# Patient Record
Sex: MALE | Race: BLACK OR AFRICAN AMERICAN | NOT HISPANIC OR LATINO
[De-identification: names, ages, dates, MRNs, and addresses within clinical notes are randomized per-mention and may not be internally consistent; named-entity substitution may affect disease eponyms.]

---

## 2017-10-10 PROBLEM — Z00.00 ENCOUNTER FOR PREVENTIVE HEALTH EXAMINATION: Status: ACTIVE | Noted: 2017-10-10

## 2017-10-17 ENCOUNTER — APPOINTMENT (OUTPATIENT)
Dept: UROLOGY | Facility: CLINIC | Age: 67
End: 2017-10-17
Payer: MEDICARE

## 2017-10-17 VITALS — DIASTOLIC BLOOD PRESSURE: 89 MMHG | SYSTOLIC BLOOD PRESSURE: 153 MMHG | HEART RATE: 77 BPM | TEMPERATURE: 99.4 F

## 2017-10-17 DIAGNOSIS — Z86.79 PERSONAL HISTORY OF OTHER DISEASES OF THE CIRCULATORY SYSTEM: ICD-10-CM

## 2017-10-17 DIAGNOSIS — F17.200 NICOTINE DEPENDENCE, UNSPECIFIED, UNCOMPLICATED: ICD-10-CM

## 2017-10-17 PROCEDURE — 99204 OFFICE O/P NEW MOD 45 MIN: CPT

## 2017-10-23 PROBLEM — F17.200 HEAVY TOBACCO SMOKER: Status: ACTIVE | Noted: 2017-10-23

## 2017-10-23 PROBLEM — Z86.79 HISTORY OF HYPERTENSION: Status: RESOLVED | Noted: 2017-10-23 | Resolved: 2017-10-23

## 2017-10-23 LAB
PSA FREE FLD-MCNC: 19.6
PSA FREE SERPL-MCNC: 0.97 NG/ML
PSA SERPL-MCNC: 4.96 NG/ML

## 2017-10-29 ENCOUNTER — FORM ENCOUNTER (OUTPATIENT)
Age: 67
End: 2017-10-29

## 2017-10-30 ENCOUNTER — OUTPATIENT (OUTPATIENT)
Dept: OUTPATIENT SERVICES | Facility: HOSPITAL | Age: 67
LOS: 1 days | End: 2017-10-30
Payer: MEDICARE

## 2017-10-30 PROCEDURE — A9585: CPT

## 2017-10-30 PROCEDURE — 72196 MRI PELVIS W/DYE: CPT

## 2017-10-30 PROCEDURE — 72196 MRI PELVIS W/DYE: CPT | Mod: 26

## 2018-01-16 ENCOUNTER — APPOINTMENT (OUTPATIENT)
Dept: UROLOGY | Facility: CLINIC | Age: 68
End: 2018-01-16
Payer: COMMERCIAL

## 2018-01-16 VITALS
HEART RATE: 86 BPM | SYSTOLIC BLOOD PRESSURE: 133 MMHG | DIASTOLIC BLOOD PRESSURE: 84 MMHG | HEIGHT: 70 IN | OXYGEN SATURATION: 99 % | TEMPERATURE: 98.2 F

## 2018-01-16 VITALS
TEMPERATURE: 98.7 F | HEART RATE: 70 BPM | WEIGHT: 175 LBS | DIASTOLIC BLOOD PRESSURE: 84 MMHG | OXYGEN SATURATION: 99 % | SYSTOLIC BLOOD PRESSURE: 133 MMHG | BODY MASS INDEX: 25.05 KG/M2 | HEIGHT: 70 IN

## 2018-01-16 PROCEDURE — 99213 OFFICE O/P EST LOW 20 MIN: CPT

## 2018-03-13 ENCOUNTER — APPOINTMENT (OUTPATIENT)
Dept: UROLOGY | Facility: CLINIC | Age: 68
End: 2018-03-13
Payer: MEDICARE

## 2018-03-13 ENCOUNTER — LABORATORY RESULT (OUTPATIENT)
Age: 68
End: 2018-03-13

## 2018-03-13 VITALS — TEMPERATURE: 97.4 F | DIASTOLIC BLOOD PRESSURE: 90 MMHG | SYSTOLIC BLOOD PRESSURE: 155 MMHG | HEART RATE: 79 BPM

## 2018-03-13 PROCEDURE — 76942 ECHO GUIDE FOR BIOPSY: CPT | Mod: 59

## 2018-03-13 PROCEDURE — 55700: CPT

## 2018-03-13 PROCEDURE — 76872 US TRANSRECTAL: CPT

## 2018-03-20 ENCOUNTER — APPOINTMENT (OUTPATIENT)
Dept: UROLOGY | Facility: CLINIC | Age: 68
End: 2018-03-20
Payer: MEDICARE

## 2018-03-20 VITALS — SYSTOLIC BLOOD PRESSURE: 146 MMHG | TEMPERATURE: 98.7 F | HEART RATE: 69 BPM | DIASTOLIC BLOOD PRESSURE: 80 MMHG

## 2018-03-20 PROCEDURE — 99214 OFFICE O/P EST MOD 30 MIN: CPT

## 2018-09-13 ENCOUNTER — APPOINTMENT (OUTPATIENT)
Dept: UROLOGY | Facility: CLINIC | Age: 68
End: 2018-09-13
Payer: MEDICARE

## 2018-09-13 VITALS — SYSTOLIC BLOOD PRESSURE: 144 MMHG | HEART RATE: 80 BPM | TEMPERATURE: 98.5 F | DIASTOLIC BLOOD PRESSURE: 81 MMHG

## 2018-09-13 PROCEDURE — 99213 OFFICE O/P EST LOW 20 MIN: CPT

## 2018-09-24 ENCOUNTER — RESULT REVIEW (OUTPATIENT)
Age: 68
End: 2018-09-24

## 2018-09-24 LAB — PSA SERPL-MCNC: 6.95 NG/ML

## 2019-03-12 ENCOUNTER — APPOINTMENT (OUTPATIENT)
Dept: UROLOGY | Facility: CLINIC | Age: 69
End: 2019-03-12
Payer: MEDICARE

## 2019-03-12 VITALS — HEART RATE: 79 BPM | SYSTOLIC BLOOD PRESSURE: 150 MMHG | TEMPERATURE: 98.1 F | DIASTOLIC BLOOD PRESSURE: 83 MMHG

## 2019-03-12 PROCEDURE — 99213 OFFICE O/P EST LOW 20 MIN: CPT

## 2019-03-12 NOTE — HISTORY OF PRESENT ILLNESS
[FreeTextEntry1] : kandis 6 on active surveillance\par PSA from 5-7\par last PNBX 3/2018 small volume akndis 6\par MRI 10/2017 PRIADS 2\par

## 2019-03-13 LAB
APPEARANCE: CLEAR
BACTERIA: NEGATIVE
BILIRUBIN URINE: NEGATIVE
BLOOD URINE: NEGATIVE
COLOR: NORMAL
GLUCOSE QUALITATIVE U: NEGATIVE
HYALINE CASTS: 0 /LPF
KETONES URINE: NEGATIVE
LEUKOCYTE ESTERASE URINE: NEGATIVE
MICROSCOPIC-UA: NORMAL
NITRITE URINE: NEGATIVE
PH URINE: 6.5
PROTEIN URINE: NORMAL
PSA SERPL-MCNC: 6.75 NG/ML
RED BLOOD CELLS URINE: 3 /HPF
SPECIFIC GRAVITY URINE: 1.02
SQUAMOUS EPITHELIAL CELLS: 0 /HPF
UROBILINOGEN URINE: NORMAL
WHITE BLOOD CELLS URINE: 1 /HPF

## 2019-03-18 LAB — BACTERIA UR CULT: NORMAL

## 2019-07-11 ENCOUNTER — APPOINTMENT (OUTPATIENT)
Dept: UROLOGY | Facility: CLINIC | Age: 69
End: 2019-07-11
Payer: MEDICARE

## 2019-07-11 PROCEDURE — 99213 OFFICE O/P EST LOW 20 MIN: CPT

## 2019-07-11 NOTE — ASSESSMENT
[FreeTextEntry1] : prostate cancer\par await MRI results\par will repeat PSA 6 months\par if abnromal MRI for PNBX\par otherwise continue with surveillance\par f/u 6 months

## 2019-07-29 ENCOUNTER — APPOINTMENT (OUTPATIENT)
Dept: SURGERY | Facility: CLINIC | Age: 69
End: 2019-07-29
Payer: MEDICARE

## 2019-07-29 VITALS
TEMPERATURE: 97.9 F | DIASTOLIC BLOOD PRESSURE: 93 MMHG | WEIGHT: 195.38 LBS | BODY MASS INDEX: 28.61 KG/M2 | SYSTOLIC BLOOD PRESSURE: 148 MMHG | HEART RATE: 69 BPM | OXYGEN SATURATION: 99 % | HEIGHT: 69.25 IN

## 2019-07-29 DIAGNOSIS — K43.9 VENTRAL HERNIA W/OUT OBSTRUCTION OR GANGRENE: ICD-10-CM

## 2019-07-29 DIAGNOSIS — K42.9 UMBILICAL HERNIA W/OUT OBSTRUCTION OR GANGRENE: ICD-10-CM

## 2019-07-29 PROCEDURE — 99203 OFFICE O/P NEW LOW 30 MIN: CPT

## 2019-08-07 PROBLEM — K42.9 UMBILICAL HERNIA WITHOUT OBSTRUCTION AND WITHOUT GANGRENE: Status: ACTIVE | Noted: 2019-08-07

## 2019-08-07 PROBLEM — K43.9 VENTRAL HERNIA WITHOUT OBSTRUCTION OR GANGRENE: Status: ACTIVE | Noted: 2019-08-07

## 2019-09-19 ENCOUNTER — RESULT REVIEW (OUTPATIENT)
Age: 69
End: 2019-09-19

## 2019-09-19 ENCOUNTER — OUTPATIENT (OUTPATIENT)
Dept: OUTPATIENT SERVICES | Facility: HOSPITAL | Age: 69
LOS: 1 days | Discharge: HOME | End: 2019-09-19
Payer: MEDICARE

## 2019-09-19 PROCEDURE — S2900 ROBOTIC SURGICAL SYSTEM: CPT | Mod: NC

## 2019-09-19 PROCEDURE — 15734 MUSCLE-SKIN GRAFT TRUNK: CPT

## 2019-09-19 PROCEDURE — 49653: CPT

## 2019-09-19 RX ORDER — IBUPROFEN 200 MG
1 TABLET ORAL
Qty: 42 | Refills: 0
Start: 2019-09-19 | End: 2019-10-02

## 2019-09-19 RX ORDER — DOCUSATE SODIUM 100 MG
1 CAPSULE ORAL
Qty: 28 | Refills: 0
Start: 2019-09-19 | End: 2019-10-02

## 2019-09-19 RX ORDER — OXYCODONE HYDROCHLORIDE 5 MG/1
1 TABLET ORAL
Qty: 10 | Refills: 0
Start: 2019-09-19

## 2019-09-30 ENCOUNTER — APPOINTMENT (OUTPATIENT)
Dept: SURGERY | Facility: CLINIC | Age: 69
End: 2019-09-30
Payer: MEDICARE

## 2019-09-30 VITALS
SYSTOLIC BLOOD PRESSURE: 159 MMHG | OXYGEN SATURATION: 98 % | TEMPERATURE: 97.4 F | BODY MASS INDEX: 29.18 KG/M2 | DIASTOLIC BLOOD PRESSURE: 84 MMHG | HEART RATE: 87 BPM | HEIGHT: 69.25 IN | WEIGHT: 199.25 LBS

## 2019-09-30 PROCEDURE — 99024 POSTOP FOLLOW-UP VISIT: CPT

## 2019-10-09 NOTE — ASSESSMENT
[FreeTextEntry1] : .68 year old male with symptomatic hernia. Now doing well after surgery. We discussed natural scar maturation and gradual return to normal activity.

## 2019-10-09 NOTE — PHYSICAL EXAM
[Normal Breath Sounds] : Normal breath sounds [Normal Heart Sounds] : normal heart sounds [Alert] : alert [Oriented to Person] : oriented to person [Oriented to Place] : oriented to place [Oriented to Time] : oriented to time [Calm] : calm [JVD] : no jugular venous distention  [Abdominal Masses] : No abdominal masses [Abdomen Tenderness] : ~T ~M No abdominal tenderness [Tender] : was nontender [Enlarged] : not enlarged [de-identified] : KRISTI. Sherri. Appropriate. Comfortable. [de-identified] : Pupils equal. No Scleral Icterus. NCAT. [de-identified] : Supple. Trachea midline. No overt lymphadenopathy. No JVD [de-identified] : Abdomen is soft, non tender and non distended. Do not appreciate any overt mass. Incisions are healing well. No evidence of hernia recurrence.

## 2019-10-09 NOTE — HISTORY OF PRESENT ILLNESS
[de-identified] : 68 year old male. Known to me. Now s/p robotic assisted ventral hernia repair with mesh. Doing well. Denies any fevers, chills or shortness of breath. Eating and drinking. Some discomfort but improving.

## 2019-10-21 ENCOUNTER — APPOINTMENT (OUTPATIENT)
Dept: SURGERY | Facility: CLINIC | Age: 69
End: 2019-10-21
Payer: MEDICARE

## 2019-10-21 VITALS
SYSTOLIC BLOOD PRESSURE: 134 MMHG | HEIGHT: 69.25 IN | BODY MASS INDEX: 28.99 KG/M2 | TEMPERATURE: 97.3 F | WEIGHT: 198 LBS | HEART RATE: 109 BPM | OXYGEN SATURATION: 98 % | DIASTOLIC BLOOD PRESSURE: 79 MMHG

## 2019-10-21 DIAGNOSIS — K92.1 MELENA: ICD-10-CM

## 2019-10-21 DIAGNOSIS — R06.02 SHORTNESS OF BREATH: ICD-10-CM

## 2019-10-21 PROCEDURE — 99213 OFFICE O/P EST LOW 20 MIN: CPT | Mod: 24

## 2019-10-22 ENCOUNTER — OTHER (OUTPATIENT)
Age: 69
End: 2019-10-22

## 2019-10-22 LAB
ALBUMIN SERPL ELPH-MCNC: 4.7 G/DL
ALP BLD-CCNC: 53 U/L
ALT SERPL-CCNC: 9 U/L
ANION GAP SERPL CALC-SCNC: 15 MMOL/L
AST SERPL-CCNC: 12 U/L
BASOPHILS # BLD AUTO: 0.14 K/UL
BASOPHILS NFR BLD AUTO: 1.2 %
BILIRUB SERPL-MCNC: 0.2 MG/DL
BUN SERPL-MCNC: 15 MG/DL
CALCIUM SERPL-MCNC: 9.8 MG/DL
CHLORIDE SERPL-SCNC: 103 MMOL/L
CO2 SERPL-SCNC: 24 MMOL/L
CREAT SERPL-MCNC: 1.15 MG/DL
EOSINOPHIL # BLD AUTO: 0.17 K/UL
EOSINOPHIL NFR BLD AUTO: 1.4 %
GLUCOSE SERPL-MCNC: 107 MG/DL
HCT VFR BLD CALC: 33.9 %
HGB BLD-MCNC: 10.7 G/DL
IMM GRANULOCYTES NFR BLD AUTO: 0.3 %
LYMPHOCYTES # BLD AUTO: 2.59 K/UL
LYMPHOCYTES NFR BLD AUTO: 21.8 %
MAN DIFF?: NORMAL
MCHC RBC-ENTMCNC: 29.1 PG
MCHC RBC-ENTMCNC: 31.6 GM/DL
MCV RBC AUTO: 92.1 FL
MONOCYTES # BLD AUTO: 0.95 K/UL
MONOCYTES NFR BLD AUTO: 8 %
NEUTROPHILS # BLD AUTO: 8 K/UL
NEUTROPHILS NFR BLD AUTO: 67.3 %
PLATELET # BLD AUTO: 404 K/UL
POTASSIUM SERPL-SCNC: 3.7 MMOL/L
PROT SERPL-MCNC: 6.8 G/DL
RBC # BLD: 3.68 M/UL
RBC # FLD: 13.9 %
SODIUM SERPL-SCNC: 142 MMOL/L
WBC # FLD AUTO: 11.89 K/UL

## 2019-10-22 NOTE — HISTORY OF PRESENT ILLNESS
[de-identified] : 68 year old male. Known to me. Now s/p robotic assisted ventral hernia repair with mesh. Doing well. Denies any fevers, chills or shortness of breath. Eating and drinking. Some discomfort but improving.  [de-identified] : 10-\par Patient comes to the office today. Overall feels well. No pain at incisions. He is eating and drinking without issue. DOes report to me that he had some shortness of breath while running to catch a bus and also reports he had some blood in recent bowel movement. Happened one time. Hasn't noticed it since. Reports he may be due for an EGD and colonsoscopy. Otherwise well from hernia stanpoibnt.

## 2019-10-22 NOTE — PHYSICAL EXAM
[JVD] : no jugular venous distention  [Normal Breath Sounds] : Normal breath sounds [Normal Heart Sounds] : normal heart sounds [Abdominal Masses] : No abdominal masses [Abdomen Tenderness] : ~T ~M No abdominal tenderness [Enlarged] : not enlarged [Tender] : was nontender [Oriented to Person] : oriented to person [Alert] : alert [Oriented to Place] : oriented to place [Oriented to Time] : oriented to time [Calm] : calm [de-identified] : KRISTI. Sherri. Appropriate. Comfortable. [de-identified] : Pupils equal. No Scleral Icterus. NCAT. [de-identified] : Abdomen is soft, non tender and non distended. Do not appreciate any overt mass. Incisions are healing well. No evidence of hernia recurrence.  [de-identified] : Supple. Trachea midline. No overt lymphadenopathy. No JVD

## 2019-10-22 NOTE — ASSESSMENT
[FreeTextEntry1] : .68 year old male with symptomatic hernia. Now doing well after surgery. We discussed natural scar maturation and gradual return to normal activity. \par \par WIth regard to his report of blood and some shortness of breath. Discussed he may be anemic. I have ordered CBC and CMP. I have asked him to make appointment with his PCP and his gastroenterologist to further evaluate the symptoms. Notably greatre than 50% of todays 15 minute follow up visit for this new issue unrelated to his surgery was spent on counseling and coordination of care.

## 2020-01-07 ENCOUNTER — APPOINTMENT (OUTPATIENT)
Dept: UROLOGY | Facility: CLINIC | Age: 70
End: 2020-01-07
Payer: MEDICARE

## 2020-01-07 VITALS — SYSTOLIC BLOOD PRESSURE: 130 MMHG | DIASTOLIC BLOOD PRESSURE: 80 MMHG | HEART RATE: 76 BPM | TEMPERATURE: 97.7 F

## 2020-01-07 PROCEDURE — 99213 OFFICE O/P EST LOW 20 MIN: CPT

## 2020-01-07 NOTE — ASSESSMENT
[FreeTextEntry1] : prsotate cancer\par on surveillance\par good candidate\par for repeat PSA today\par f/u 6months

## 2020-01-07 NOTE — HISTORY OF PRESENT ILLNESS
[FreeTextEntry1] : s/p herniorrhaphy\par pain resolving\par feels well\par kandis 6 CaP on surveillance - 2-3 biopises\par MRI 4/2019 PIRADS 2 43gram prostate\par voding well

## 2020-01-08 LAB — PSA SERPL-MCNC: 9.79 NG/ML

## 2020-07-09 ENCOUNTER — APPOINTMENT (OUTPATIENT)
Dept: UROLOGY | Facility: CLINIC | Age: 70
End: 2020-07-09
Payer: MEDICARE

## 2020-07-09 VITALS
BODY MASS INDEX: 28.99 KG/M2 | TEMPERATURE: 98.4 F | HEART RATE: 76 BPM | DIASTOLIC BLOOD PRESSURE: 86 MMHG | SYSTOLIC BLOOD PRESSURE: 159 MMHG | HEIGHT: 69.25 IN | WEIGHT: 198 LBS

## 2020-07-09 PROCEDURE — 99213 OFFICE O/P EST LOW 20 MIN: CPT

## 2020-07-09 NOTE — HISTORY OF PRESENT ILLNESS
[FreeTextEntry1] : prostate cancer on active surveillance\par 2 PNBX outside 2014 and 2016 kandis 6\par PNBX here 2018 2 small foci suggestive of CaP\par rising PSAs up to 9.8 1/2020\par MRI 4/2019 43gram PIRADS 2\par

## 2020-07-09 NOTE — ASSESSMENT
[FreeTextEntry1] : CaP on surveillance\par repeat PSA today \par if 9 or greater will repeat MRI \par consider repeat MRI\par otherwise f/u 6 months

## 2020-07-14 LAB
PSA FREE FLD-MCNC: 11 %
PSA FREE SERPL-MCNC: 1.13 NG/ML
PSA SERPL-MCNC: 10.4 NG/ML

## 2020-09-09 ENCOUNTER — OUTPATIENT (OUTPATIENT)
Dept: OUTPATIENT SERVICES | Facility: HOSPITAL | Age: 70
LOS: 1 days | End: 2020-09-09
Payer: MEDICARE

## 2020-09-09 ENCOUNTER — APPOINTMENT (OUTPATIENT)
Dept: UROLOGY | Facility: CLINIC | Age: 70
End: 2020-09-09
Payer: MEDICARE

## 2020-09-09 VITALS
DIASTOLIC BLOOD PRESSURE: 90 MMHG | OXYGEN SATURATION: 98 % | TEMPERATURE: 98.1 F | SYSTOLIC BLOOD PRESSURE: 152 MMHG | HEART RATE: 77 BPM

## 2020-09-09 LAB
BASOPHILS # BLD AUTO: 0.1 K/UL
BASOPHILS NFR BLD AUTO: 1.2 %
EOSINOPHIL # BLD AUTO: 0.04 K/UL
EOSINOPHIL NFR BLD AUTO: 0.5 %
HCT VFR BLD CALC: 43.4 %
HGB BLD-MCNC: 14.2 G/DL
IMM GRANULOCYTES NFR BLD AUTO: 0.2 %
LYMPHOCYTES # BLD AUTO: 2 K/UL
LYMPHOCYTES NFR BLD AUTO: 24.5 %
MAN DIFF?: NORMAL
MCHC RBC-ENTMCNC: 29.2 PG
MCHC RBC-ENTMCNC: 32.7 GM/DL
MCV RBC AUTO: 89.3 FL
MONOCYTES # BLD AUTO: 0.47 K/UL
MONOCYTES NFR BLD AUTO: 5.8 %
NEUTROPHILS # BLD AUTO: 5.52 K/UL
NEUTROPHILS NFR BLD AUTO: 67.8 %
PLATELET # BLD AUTO: 344 K/UL
RBC # BLD: 4.86 M/UL
RBC # FLD: 13.7 %
WBC # FLD AUTO: 8.15 K/UL

## 2020-09-09 PROCEDURE — 71046 X-RAY EXAM CHEST 2 VIEWS: CPT | Mod: 26

## 2020-09-09 PROCEDURE — 99214 OFFICE O/P EST MOD 30 MIN: CPT | Mod: 57

## 2020-09-09 PROCEDURE — 71046 X-RAY EXAM CHEST 2 VIEWS: CPT

## 2020-09-09 NOTE — PHYSICAL EXAM
[General Appearance - Well Nourished] : well nourished [General Appearance - Well Developed] : well developed [General Appearance - In No Acute Distress] : no acute distress [Normal Appearance] : normal appearance [Well Groomed] : well groomed [Abdomen Tenderness] : non-tender [Abdomen Soft] : soft [No Prostate Nodules] : no prostate nodules [Costovertebral Angle Tenderness] : no ~M costovertebral angle tenderness [Mood] : the mood was normal [Affect] : the affect was normal [Oriented To Time, Place, And Person] : oriented to person, place, and time [Not Anxious] : not anxious [Normal Station and Gait] : the gait and station were normal for the patient's age [No Focal Deficits] : no focal deficits

## 2020-09-09 NOTE — HISTORY OF PRESENT ILLNESS
[FreeTextEntry1] : 70yo male referred by Dr. Montiel for evaluation for MRI fusion biopsy. 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 PSA has been trending up, now 10. Had MRI which shows 47gm prostate, 6mm PI-RADS 4 lesion.  [None] : None

## 2020-09-09 NOTE — ASSESSMENT
[FreeTextEntry1] : 68yo male with h/o GG 1 prostate cancer diagnosed 2016 on active surveillance\par PSA has been trending up, now 10\par MRI which PI-RADS 4 lesion. Reviewed imaging and report\par Recommend MRI guided transperineal biopsy. \par Medical clearance

## 2020-09-10 LAB
ALBUMIN SERPL ELPH-MCNC: 5 G/DL
ALP BLD-CCNC: 54 U/L
ALT SERPL-CCNC: 11 U/L
ANION GAP SERPL CALC-SCNC: 15 MMOL/L
APPEARANCE: CLEAR
APTT BLD: 29.4 SEC
AST SERPL-CCNC: 13 U/L
BACTERIA: NEGATIVE
BILIRUB SERPL-MCNC: 0.4 MG/DL
BILIRUBIN URINE: NEGATIVE
BLOOD URINE: NEGATIVE
BUN SERPL-MCNC: 13 MG/DL
CALCIUM OXALATE CRYSTALS: ABNORMAL
CALCIUM SERPL-MCNC: 9.7 MG/DL
CHLORIDE SERPL-SCNC: 101 MMOL/L
CO2 SERPL-SCNC: 25 MMOL/L
COLOR: YELLOW
CREAT SERPL-MCNC: 1.38 MG/DL
GLUCOSE QUALITATIVE U: NEGATIVE
GLUCOSE SERPL-MCNC: 111 MG/DL
HYALINE CASTS: 1 /LPF
INR PPP: 1.03 RATIO
KETONES URINE: NEGATIVE
LEUKOCYTE ESTERASE URINE: NEGATIVE
MICROSCOPIC-UA: NORMAL
NITRITE URINE: NEGATIVE
PH URINE: 6.5
POTASSIUM SERPL-SCNC: 4.1 MMOL/L
PROT SERPL-MCNC: 7.2 G/DL
PROTEIN URINE: ABNORMAL
PT BLD: 12.2 SEC
RED BLOOD CELLS URINE: 2 /HPF
SODIUM SERPL-SCNC: 141 MMOL/L
SPECIFIC GRAVITY URINE: 1.02
SQUAMOUS EPITHELIAL CELLS: 2 /HPF
URINE COMMENTS: NORMAL
UROBILINOGEN URINE: NORMAL
WHITE BLOOD CELLS URINE: 2 /HPF

## 2020-09-14 ENCOUNTER — TRANSCRIPTION ENCOUNTER (OUTPATIENT)
Age: 70
End: 2020-09-14

## 2020-09-14 LAB — SARS-COV-2 N GENE NPH QL NAA+PROBE: NOT DETECTED

## 2020-09-15 ENCOUNTER — OUTPATIENT (OUTPATIENT)
Dept: OUTPATIENT SERVICES | Facility: HOSPITAL | Age: 70
LOS: 1 days | Discharge: HOME | End: 2020-09-15
Payer: MEDICARE

## 2020-09-15 ENCOUNTER — RESULT REVIEW (OUTPATIENT)
Age: 70
End: 2020-09-15

## 2020-09-15 ENCOUNTER — APPOINTMENT (OUTPATIENT)
Dept: UROLOGY | Facility: AMBULATORY SURGERY CENTER | Age: 70
End: 2020-09-15

## 2020-09-15 PROCEDURE — 76872 US TRANSRECTAL: CPT | Mod: 26

## 2020-09-15 PROCEDURE — 76942 ECHO GUIDE FOR BIOPSY: CPT | Mod: 26,59

## 2020-09-15 PROCEDURE — 55706 BX PRST8 NDL SAT SAMPLING: CPT | Mod: 22

## 2020-09-15 PROCEDURE — 88305 TISSUE EXAM BY PATHOLOGIST: CPT | Mod: 26

## 2020-09-16 LAB — SURGICAL PATHOLOGY STUDY: SIGNIFICANT CHANGE UP

## 2020-09-30 ENCOUNTER — APPOINTMENT (OUTPATIENT)
Dept: UROLOGY | Facility: CLINIC | Age: 70
End: 2020-09-30
Payer: MEDICARE

## 2020-09-30 VITALS — HEART RATE: 77 BPM | SYSTOLIC BLOOD PRESSURE: 159 MMHG | TEMPERATURE: 98.1 F | DIASTOLIC BLOOD PRESSURE: 94 MMHG

## 2020-09-30 PROCEDURE — 99215 OFFICE O/P EST HI 40 MIN: CPT

## 2020-09-30 NOTE — HISTORY OF PRESENT ILLNESS
[FreeTextEntry1] : 68yo male referred by Dr. Montiel for evaluation for MRI fusion biopsy. 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 PSA has been trending up, now 10. Had MRI which shows 47gm prostate, 6mm PI-RADS 4 lesion. \par \par 9/30/20 Here for f/u. Had MRI fusion biopsy. Biopsy shows 5/13 cores positive, Reena 3+4 disease, up to 70% core involved. No complications from biopsy.  [None] : None

## 2020-09-30 NOTE — ASSESSMENT
[FreeTextEntry1] : 68yo male with h/o GG 1 prostate cancer diagnosed 2016 on active surveillance\par MRI which PI-RADS 4 lesion. \par Repeat biopsy shows GG 2, high volume disease\par Discussed biopsy findings with patient and treatment options\par Patient is considered favortable intermediate risk by NCCN guidelines. \par Standard treatment options including watchful waiting, active surveillance, radical prostatectomy or radiation therapy were discussed. \par Radiation can be given as brachytherapy or external beam \par Discussed alternative treatment options including focal therapy.\par Radical prostatectomy is often performed by robot assisted laparoscopic technique. Discussed immediate risks of surgery including bleeding, infection, blood clot or injury to surrounding organs. Discussed long term risks including urinary incontinence and sexual dysfunction. \par Discussed risks of radiation treatment including urinary and bowel symptoms and sexual dysfunction. \par Discussed risks of adjuvant hormone treatment including hot flashes, fatigue, sexual dysfunction. \par He will return in 2 weeks with his wife to discuss further

## 2020-09-30 NOTE — PHYSICAL EXAM
[General Appearance - Well Nourished] : well nourished [General Appearance - Well Developed] : well developed [Normal Appearance] : normal appearance [Well Groomed] : well groomed [General Appearance - In No Acute Distress] : no acute distress [Abdomen Soft] : soft [Abdomen Tenderness] : non-tender [Costovertebral Angle Tenderness] : no ~M costovertebral angle tenderness [FreeTextEntry1] : Prior CHETNA no nodules [Oriented To Time, Place, And Person] : oriented to person, place, and time [Affect] : the affect was normal [Mood] : the mood was normal [Not Anxious] : not anxious [Normal Station and Gait] : the gait and station were normal for the patient's age [No Focal Deficits] : no focal deficits

## 2020-10-14 ENCOUNTER — APPOINTMENT (OUTPATIENT)
Dept: UROLOGY | Facility: CLINIC | Age: 70
End: 2020-10-14
Payer: MEDICARE

## 2020-10-14 VITALS — TEMPERATURE: 98 F | HEART RATE: 79 BPM | SYSTOLIC BLOOD PRESSURE: 164 MMHG | DIASTOLIC BLOOD PRESSURE: 106 MMHG

## 2020-10-14 LAB
ALBUMIN SERPL ELPH-MCNC: 4.9 G/DL
ALP BLD-CCNC: 62 U/L
ALT SERPL-CCNC: 12 U/L
ANION GAP SERPL CALC-SCNC: 11 MMOL/L
APTT BLD: 30.2 SEC
AST SERPL-CCNC: 14 U/L
BASOPHILS # BLD AUTO: 0.09 K/UL
BASOPHILS NFR BLD AUTO: 1.4 %
BILIRUB SERPL-MCNC: 0.4 MG/DL
BUN SERPL-MCNC: 18 MG/DL
CALCIUM SERPL-MCNC: 9.8 MG/DL
CHLORIDE SERPL-SCNC: 102 MMOL/L
CO2 SERPL-SCNC: 27 MMOL/L
CREAT SERPL-MCNC: 1.28 MG/DL
EOSINOPHIL # BLD AUTO: 0.05 K/UL
EOSINOPHIL NFR BLD AUTO: 0.8 %
GLUCOSE SERPL-MCNC: 106 MG/DL
HCT VFR BLD CALC: 43.9 %
HGB BLD-MCNC: 14.1 G/DL
IMM GRANULOCYTES NFR BLD AUTO: 0.3 %
INR PPP: 0.99 RATIO
LYMPHOCYTES # BLD AUTO: 1.91 K/UL
LYMPHOCYTES NFR BLD AUTO: 29.2 %
MAN DIFF?: NORMAL
MCHC RBC-ENTMCNC: 28.7 PG
MCHC RBC-ENTMCNC: 32.1 GM/DL
MCV RBC AUTO: 89.2 FL
MONOCYTES # BLD AUTO: 0.47 K/UL
MONOCYTES NFR BLD AUTO: 7.2 %
NEUTROPHILS # BLD AUTO: 4 K/UL
NEUTROPHILS NFR BLD AUTO: 61.1 %
PLATELET # BLD AUTO: 331 K/UL
POTASSIUM SERPL-SCNC: 4.2 MMOL/L
PROT SERPL-MCNC: 7.1 G/DL
PT BLD: 11.7 SEC
RBC # BLD: 4.92 M/UL
RBC # FLD: 13.7 %
SODIUM SERPL-SCNC: 141 MMOL/L
WBC # FLD AUTO: 6.54 K/UL

## 2020-10-14 PROCEDURE — 99214 OFFICE O/P EST MOD 30 MIN: CPT | Mod: 57

## 2020-10-14 NOTE — PHYSICAL EXAM
[General Appearance - Well Developed] : well developed [General Appearance - Well Nourished] : well nourished [Normal Appearance] : normal appearance [Well Groomed] : well groomed [General Appearance - In No Acute Distress] : no acute distress [Abdomen Soft] : soft [Abdomen Tenderness] : non-tender [Costovertebral Angle Tenderness] : no ~M costovertebral angle tenderness [FreeTextEntry1] : Prior CHETNA no nodules [Oriented To Time, Place, And Person] : oriented to person, place, and time [Affect] : the affect was normal [Mood] : the mood was normal [Not Anxious] : not anxious [Normal Station and Gait] : the gait and station were normal for the patient's age [No Focal Deficits] : no focal deficits

## 2020-10-14 NOTE — ASSESSMENT
[FreeTextEntry1] : 70yo male with h/o GG 1 prostate cancer diagnosed 2016 on active surveillance\par MRI which PI-RADS 4 lesion. \par Repeat biopsy shows GG 2, high volume disease\par Discussed biopsy findings with patient and treatment options\par Patient is considered favortable intermediate risk by NCCN guidelines. \par Standard treatment options including watchful waiting, active surveillance, radical prostatectomy or radiation therapy were discussed. \par He would like to proceed with robotic prostatectomy\par Reviewed procedure, risks, recovery time, and prognosis\par Medical and cardiac clearance

## 2020-10-15 LAB
APPEARANCE: CLEAR
BACTERIA: NEGATIVE
BILIRUBIN URINE: NEGATIVE
BLOOD URINE: NEGATIVE
COLOR: NORMAL
GLUCOSE QUALITATIVE U: NEGATIVE
HYALINE CASTS: 0 /LPF
KETONES URINE: NEGATIVE
LEUKOCYTE ESTERASE URINE: NEGATIVE
MICROSCOPIC-UA: NORMAL
NITRITE URINE: NEGATIVE
PH URINE: 6.5
PROTEIN URINE: NORMAL
RED BLOOD CELLS URINE: 2 /HPF
SPECIFIC GRAVITY URINE: 1.02
SQUAMOUS EPITHELIAL CELLS: 1 /HPF
UROBILINOGEN URINE: NORMAL
WHITE BLOOD CELLS URINE: 1 /HPF

## 2020-10-16 LAB — BACTERIA UR CULT: NORMAL

## 2020-11-17 LAB — SARS-COV-2 N GENE NPH QL NAA+PROBE: NOT DETECTED

## 2020-11-18 VITALS
DIASTOLIC BLOOD PRESSURE: 86 MMHG | RESPIRATION RATE: 16 BRPM | OXYGEN SATURATION: 99 % | WEIGHT: 178.57 LBS | SYSTOLIC BLOOD PRESSURE: 146 MMHG | HEIGHT: 70 IN | TEMPERATURE: 97 F | HEART RATE: 84 BPM

## 2020-11-19 ENCOUNTER — APPOINTMENT (OUTPATIENT)
Dept: UROLOGY | Facility: HOSPITAL | Age: 70
End: 2020-11-19

## 2020-11-19 ENCOUNTER — INPATIENT (INPATIENT)
Facility: HOSPITAL | Age: 70
LOS: 0 days | Discharge: HOME | DRG: 708 | End: 2020-11-20
Attending: UROLOGY | Admitting: UROLOGY
Payer: COMMERCIAL

## 2020-11-19 ENCOUNTER — RESULT REVIEW (OUTPATIENT)
Age: 70
End: 2020-11-19

## 2020-11-19 DIAGNOSIS — Z98.890 OTHER SPECIFIED POSTPROCEDURAL STATES: Chronic | ICD-10-CM

## 2020-11-19 DIAGNOSIS — E78.5 HYPERLIPIDEMIA, UNSPECIFIED: ICD-10-CM

## 2020-11-19 DIAGNOSIS — I10 ESSENTIAL (PRIMARY) HYPERTENSION: ICD-10-CM

## 2020-11-19 DIAGNOSIS — C61 MALIGNANT NEOPLASM OF PROSTATE: ICD-10-CM

## 2020-11-19 LAB
ANION GAP SERPL CALC-SCNC: 20 MMOL/L — HIGH (ref 5–17)
BLD GP AB SCN SERPL QL: NEGATIVE — SIGNIFICANT CHANGE UP
BLD GP AB SCN SERPL QL: NEGATIVE — SIGNIFICANT CHANGE UP
BUN SERPL-MCNC: 20 MG/DL — SIGNIFICANT CHANGE UP (ref 7–23)
CALCIUM SERPL-MCNC: 8.8 MG/DL — SIGNIFICANT CHANGE UP (ref 8.4–10.5)
CHLORIDE SERPL-SCNC: 102 MMOL/L — SIGNIFICANT CHANGE UP (ref 96–108)
CO2 SERPL-SCNC: 19 MMOL/L — LOW (ref 22–31)
CREAT SERPL-MCNC: 1.2 MG/DL — SIGNIFICANT CHANGE UP (ref 0.5–1.3)
GLUCOSE SERPL-MCNC: 198 MG/DL — HIGH (ref 70–99)
HCT VFR BLD CALC: 35.1 % — LOW (ref 39–50)
HGB BLD-MCNC: 11.5 G/DL — LOW (ref 13–17)
MAGNESIUM SERPL-MCNC: 1.8 MG/DL — SIGNIFICANT CHANGE UP (ref 1.6–2.6)
MCHC RBC-ENTMCNC: 28.8 PG — SIGNIFICANT CHANGE UP (ref 27–34)
MCHC RBC-ENTMCNC: 32.8 GM/DL — SIGNIFICANT CHANGE UP (ref 32–36)
MCV RBC AUTO: 88 FL — SIGNIFICANT CHANGE UP (ref 80–100)
NRBC # BLD: 0 /100 WBCS — SIGNIFICANT CHANGE UP (ref 0–0)
PHOSPHATE SERPL-MCNC: 4.6 MG/DL — HIGH (ref 2.5–4.5)
PLATELET # BLD AUTO: 281 K/UL — SIGNIFICANT CHANGE UP (ref 150–400)
POTASSIUM SERPL-MCNC: 3.4 MMOL/L — LOW (ref 3.5–5.3)
POTASSIUM SERPL-SCNC: 3.4 MMOL/L — LOW (ref 3.5–5.3)
RBC # BLD: 3.99 M/UL — LOW (ref 4.2–5.8)
RBC # FLD: 13.2 % — SIGNIFICANT CHANGE UP (ref 10.3–14.5)
RH IG SCN BLD-IMP: POSITIVE — SIGNIFICANT CHANGE UP
RH IG SCN BLD-IMP: POSITIVE — SIGNIFICANT CHANGE UP
SODIUM SERPL-SCNC: 141 MMOL/L — SIGNIFICANT CHANGE UP (ref 135–145)
WBC # BLD: 17.78 K/UL — HIGH (ref 3.8–10.5)
WBC # FLD AUTO: 17.78 K/UL — HIGH (ref 3.8–10.5)

## 2020-11-19 PROCEDURE — 88305 TISSUE EXAM BY PATHOLOGIST: CPT | Mod: 26

## 2020-11-19 PROCEDURE — 55866 LAPS SURG PRST8ECT RPBIC RAD: CPT

## 2020-11-19 PROCEDURE — 38571 LAPAROSCOPY LYMPHADENECTOMY: CPT

## 2020-11-19 PROCEDURE — 88309 TISSUE EXAM BY PATHOLOGIST: CPT | Mod: 26

## 2020-11-19 RX ORDER — OXYBUTYNIN CHLORIDE 5 MG
5 TABLET ORAL EVERY 8 HOURS
Refills: 0 | Status: DISCONTINUED | OUTPATIENT
Start: 2020-11-19 | End: 2020-11-20

## 2020-11-19 RX ORDER — AMLODIPINE BESYLATE 2.5 MG/1
1 TABLET ORAL
Qty: 0 | Refills: 0 | DISCHARGE

## 2020-11-19 RX ORDER — GABAPENTIN 400 MG/1
300 CAPSULE ORAL ONCE
Refills: 0 | Status: DISCONTINUED | OUTPATIENT
Start: 2020-11-19 | End: 2020-11-19

## 2020-11-19 RX ORDER — HEPARIN SODIUM 5000 [USP'U]/ML
5000 INJECTION INTRAVENOUS; SUBCUTANEOUS EVERY 8 HOURS
Refills: 0 | Status: DISCONTINUED | OUTPATIENT
Start: 2020-11-19 | End: 2020-11-20

## 2020-11-19 RX ORDER — ACETAMINOPHEN 500 MG
650 TABLET ORAL EVERY 6 HOURS
Refills: 0 | Status: DISCONTINUED | OUTPATIENT
Start: 2020-11-19 | End: 2020-11-20

## 2020-11-19 RX ORDER — OXYCODONE AND ACETAMINOPHEN 5; 325 MG/1; MG/1
1 TABLET ORAL EVERY 4 HOURS
Refills: 0 | Status: DISCONTINUED | OUTPATIENT
Start: 2020-11-19 | End: 2020-11-20

## 2020-11-19 RX ORDER — OXYCODONE AND ACETAMINOPHEN 5; 325 MG/1; MG/1
2 TABLET ORAL EVERY 6 HOURS
Refills: 0 | Status: DISCONTINUED | OUTPATIENT
Start: 2020-11-19 | End: 2020-11-20

## 2020-11-19 RX ORDER — SODIUM CHLORIDE 9 MG/ML
1000 INJECTION, SOLUTION INTRAVENOUS
Refills: 0 | Status: DISCONTINUED | OUTPATIENT
Start: 2020-11-19 | End: 2020-11-20

## 2020-11-19 RX ORDER — ONDANSETRON 8 MG/1
4 TABLET, FILM COATED ORAL EVERY 6 HOURS
Refills: 0 | Status: DISCONTINUED | OUTPATIENT
Start: 2020-11-19 | End: 2020-11-20

## 2020-11-19 RX ORDER — ACETAMINOPHEN 500 MG
1000 TABLET ORAL ONCE
Refills: 0 | Status: DISCONTINUED | OUTPATIENT
Start: 2020-11-19 | End: 2020-11-20

## 2020-11-19 RX ORDER — ATORVASTATIN CALCIUM 80 MG/1
20 TABLET, FILM COATED ORAL AT BEDTIME
Refills: 0 | Status: DISCONTINUED | OUTPATIENT
Start: 2020-11-19 | End: 2020-11-20

## 2020-11-19 RX ORDER — AMLODIPINE BESYLATE 2.5 MG/1
10 TABLET ORAL DAILY
Refills: 0 | Status: DISCONTINUED | OUTPATIENT
Start: 2020-11-20 | End: 2020-11-20

## 2020-11-19 RX ORDER — ENOXAPARIN SODIUM 100 MG/ML
40 INJECTION SUBCUTANEOUS ONCE
Refills: 0 | Status: DISCONTINUED | OUTPATIENT
Start: 2020-11-19 | End: 2020-11-19

## 2020-11-19 RX ORDER — CEFAZOLIN SODIUM 1 G
1000 VIAL (EA) INJECTION EVERY 8 HOURS
Refills: 0 | Status: COMPLETED | OUTPATIENT
Start: 2020-11-19 | End: 2020-11-20

## 2020-11-19 RX ORDER — ATORVASTATIN CALCIUM 80 MG/1
1 TABLET, FILM COATED ORAL
Qty: 0 | Refills: 0 | DISCHARGE

## 2020-11-19 RX ADMIN — Medication 650 MILLIGRAM(S): at 22:45

## 2020-11-19 RX ADMIN — Medication 650 MILLIGRAM(S): at 23:45

## 2020-11-19 RX ADMIN — HEPARIN SODIUM 5000 UNIT(S): 5000 INJECTION INTRAVENOUS; SUBCUTANEOUS at 22:47

## 2020-11-19 RX ADMIN — Medication 100 MILLIGRAM(S): at 22:44

## 2020-11-19 NOTE — PROGRESS NOTE ADULT - PROBLEM/PLAN-3
Indication:



Worsening hypoxemia



Technique:



Chest 1 view



Comparison:



10/29/2019



Findings/Impression:



Cardiovascular and mediastinum: Increased prominence of the right cardiac 

border. Correlate for evolving increased right heart pressure. 



Lungs and pleural space: A right pleural effusion. Persistent right 

perihilar opacities. Right basilar consolidation or atelectasis is not 

excluded. Mild left basilar subsegmental atelectasis. 



Bones and soft tissues: No significant change.



Dictated by Antoni Torre MD @ 10/30/2019 4:37:30 AM



Dictated by: Antoni Torre MD @ 10/30/2019 04:37:33



(Electronically Signed) DISPLAY PLAN FREE TEXT

## 2020-11-19 NOTE — PROGRESS NOTE ADULT - PROBLEM SELECTOR PLAN 1
s/p RALP 11/19  -stable  -DVT prophylaxis  -pain control  -monitor I&Os  -Diet: CLD  -OOB, IS  -Abx: Ancef  - labs

## 2020-11-19 NOTE — H&P ADULT - HISTORY OF PRESENT ILLNESS
70yo M with left renal mass here for robotic L partial Nx  Denies f/c/n/v. 70yo M with prostate Ca here for RALP  Denies f/c/n/v.

## 2020-11-19 NOTE — BRIEF OPERATIVE NOTE - NSICDXBRIEFPREOP_GEN_ALL_CORE_FT
PRE-OP DIAGNOSIS:  Prostate cancer 19-Nov-2020 17:42:31  Alexandru Garcia  
PRE-OP DIAGNOSIS:  Renal mass, left 19-Nov-2020 11:51:41  Alexandru Garcia

## 2020-11-19 NOTE — BRIEF OPERATIVE NOTE - NSICDXBRIEFPOSTOP_GEN_ALL_CORE_FT
POST-OP DIAGNOSIS:  Prostate cancer 19-Nov-2020 17:42:42  Alexandru Garcia  
POST-OP DIAGNOSIS:  Left renal mass 19-Nov-2020 11:51:50  Alexandru Garcia

## 2020-11-19 NOTE — H&P ADULT - ASSESSMENT
68yo M with left renal mass  -to OR for robotic left partial nx  -regional bed  -pain/ nausea control 70yo M with CaP  -to OR for RALP  -regional bed  -pain/ nausea control

## 2020-11-19 NOTE — PROGRESS NOTE ADULT - SUBJECTIVE AND OBJECTIVE BOX
Post OP/Procedure note: VALE [id number]    Patient reports to minimal abdominal discomfort appropriate to current state. He also complains of back pain likely secondary from positional discomfort post operatively. He denies any nausea, vomiting, chest pain, sob, dizziness, weakness. Denies suprapubic discomfort.     PE  GEN: NAD  ABD: soft, appropriately distended, minimal tenderness, incisions dry and clean, PATRIZIA with sanguinous output   : licea catheter in place, hematuria+    T(C): 37.9 (11-19-20 @ 22:28), Max: 37.9 (11-19-20 @ 22:28)  HR: 69 (11-19-20 @ 22:28) (63 - 81)  BP: 143/80 (11-19-20 @ 22:28) (110/55 - 143/80)  RR: 18 (11-19-20 @ 22:28) (16 - 50)  SpO2: 100% (11-19-20 @ 22:28) (97% - 100%)    11-19-20 @ 07:01  -  11-19-20 @ 22:41  --------------------------------------------------------  IN: 220 mL / OUT: 810 mL / NET: -590 mL

## 2020-11-19 NOTE — BRIEF OPERATIVE NOTE - NSICDXBRIEFPROCEDURE_GEN_ALL_CORE_FT
PROCEDURES:  Robotic radical prostatectomy 19-Nov-2020 17:42:15  Alexandru Garcia  
PROCEDURES:  Left partial nephrectomy 19-Nov-2020 11:51:15  Alexandru Garcia

## 2020-11-20 ENCOUNTER — TRANSCRIPTION ENCOUNTER (OUTPATIENT)
Age: 70
End: 2020-11-20

## 2020-11-20 VITALS
SYSTOLIC BLOOD PRESSURE: 151 MMHG | DIASTOLIC BLOOD PRESSURE: 86 MMHG | OXYGEN SATURATION: 97 % | TEMPERATURE: 98 F | HEART RATE: 67 BPM | RESPIRATION RATE: 18 BRPM

## 2020-11-20 LAB
ALBUMIN SERPL ELPH-MCNC: 3.8 G/DL — SIGNIFICANT CHANGE UP (ref 3.3–5)
ALP SERPL-CCNC: 44 U/L — SIGNIFICANT CHANGE UP (ref 40–120)
ALT FLD-CCNC: 7 U/L — LOW (ref 10–45)
ANION GAP SERPL CALC-SCNC: 12 MMOL/L — SIGNIFICANT CHANGE UP (ref 5–17)
AST SERPL-CCNC: 13 U/L — SIGNIFICANT CHANGE UP (ref 10–40)
BILIRUB SERPL-MCNC: 0.4 MG/DL — SIGNIFICANT CHANGE UP (ref 0.2–1.2)
BUN SERPL-MCNC: 12 MG/DL — SIGNIFICANT CHANGE UP (ref 7–23)
CALCIUM SERPL-MCNC: 9 MG/DL — SIGNIFICANT CHANGE UP (ref 8.4–10.5)
CHLORIDE SERPL-SCNC: 105 MMOL/L — SIGNIFICANT CHANGE UP (ref 96–108)
CO2 SERPL-SCNC: 25 MMOL/L — SIGNIFICANT CHANGE UP (ref 22–31)
CREAT FLD-MCNC: 1.23 MG/DL — SIGNIFICANT CHANGE UP
CREAT SERPL-MCNC: 1.03 MG/DL — SIGNIFICANT CHANGE UP (ref 0.5–1.3)
GLUCOSE SERPL-MCNC: 113 MG/DL — HIGH (ref 70–99)
HCT VFR BLD CALC: 33.8 % — LOW (ref 39–50)
HCV AB S/CO SERPL IA: 0.04 S/CO — SIGNIFICANT CHANGE UP
HCV AB SERPL-IMP: SIGNIFICANT CHANGE UP
HGB BLD-MCNC: 11.3 G/DL — LOW (ref 13–17)
MAGNESIUM SERPL-MCNC: 1.9 MG/DL — SIGNIFICANT CHANGE UP (ref 1.6–2.6)
MCHC RBC-ENTMCNC: 28.8 PG — SIGNIFICANT CHANGE UP (ref 27–34)
MCHC RBC-ENTMCNC: 33.4 GM/DL — SIGNIFICANT CHANGE UP (ref 32–36)
MCV RBC AUTO: 86 FL — SIGNIFICANT CHANGE UP (ref 80–100)
NRBC # BLD: 0 /100 WBCS — SIGNIFICANT CHANGE UP (ref 0–0)
PHOSPHATE SERPL-MCNC: 2.8 MG/DL — SIGNIFICANT CHANGE UP (ref 2.5–4.5)
PLATELET # BLD AUTO: 248 K/UL — SIGNIFICANT CHANGE UP (ref 150–400)
POTASSIUM SERPL-MCNC: 3.5 MMOL/L — SIGNIFICANT CHANGE UP (ref 3.5–5.3)
POTASSIUM SERPL-SCNC: 3.5 MMOL/L — SIGNIFICANT CHANGE UP (ref 3.5–5.3)
PROT SERPL-MCNC: 6.2 G/DL — SIGNIFICANT CHANGE UP (ref 6–8.3)
RBC # BLD: 3.93 M/UL — LOW (ref 4.2–5.8)
RBC # FLD: 13.3 % — SIGNIFICANT CHANGE UP (ref 10.3–14.5)
SODIUM SERPL-SCNC: 142 MMOL/L — SIGNIFICANT CHANGE UP (ref 135–145)
WBC # BLD: 12.89 K/UL — HIGH (ref 3.8–10.5)
WBC # FLD AUTO: 12.89 K/UL — HIGH (ref 3.8–10.5)

## 2020-11-20 PROCEDURE — 82570 ASSAY OF URINE CREATININE: CPT

## 2020-11-20 PROCEDURE — S2900: CPT

## 2020-11-20 PROCEDURE — 86900 BLOOD TYPING SEROLOGIC ABO: CPT

## 2020-11-20 PROCEDURE — 88305 TISSUE EXAM BY PATHOLOGIST: CPT

## 2020-11-20 PROCEDURE — C1889: CPT

## 2020-11-20 PROCEDURE — 36415 COLL VENOUS BLD VENIPUNCTURE: CPT

## 2020-11-20 PROCEDURE — 88309 TISSUE EXAM BY PATHOLOGIST: CPT

## 2020-11-20 PROCEDURE — 83735 ASSAY OF MAGNESIUM: CPT

## 2020-11-20 PROCEDURE — 86850 RBC ANTIBODY SCREEN: CPT

## 2020-11-20 PROCEDURE — 86901 BLOOD TYPING SEROLOGIC RH(D): CPT

## 2020-11-20 PROCEDURE — 80053 COMPREHEN METABOLIC PANEL: CPT

## 2020-11-20 PROCEDURE — C9399: CPT

## 2020-11-20 PROCEDURE — 84100 ASSAY OF PHOSPHORUS: CPT

## 2020-11-20 PROCEDURE — 85027 COMPLETE CBC AUTOMATED: CPT

## 2020-11-20 PROCEDURE — 86803 HEPATITIS C AB TEST: CPT

## 2020-11-20 PROCEDURE — 80048 BASIC METABOLIC PNL TOTAL CA: CPT

## 2020-11-20 RX ORDER — SODIUM CHLORIDE 9 MG/ML
1000 INJECTION, SOLUTION INTRAVENOUS
Refills: 0 | Status: DISCONTINUED | OUTPATIENT
Start: 2020-11-20 | End: 2020-11-20

## 2020-11-20 RX ORDER — OXYBUTYNIN CHLORIDE 5 MG
1 TABLET ORAL
Qty: 6 | Refills: 0
Start: 2020-11-20 | End: 2020-11-21

## 2020-11-20 RX ADMIN — HEPARIN SODIUM 5000 UNIT(S): 5000 INJECTION INTRAVENOUS; SUBCUTANEOUS at 05:47

## 2020-11-20 RX ADMIN — Medication 650 MILLIGRAM(S): at 15:02

## 2020-11-20 RX ADMIN — SODIUM CHLORIDE 110 MILLILITER(S): 9 INJECTION, SOLUTION INTRAVENOUS at 14:28

## 2020-11-20 RX ADMIN — Medication 100 MILLIGRAM(S): at 05:47

## 2020-11-20 RX ADMIN — Medication 650 MILLIGRAM(S): at 14:06

## 2020-11-20 RX ADMIN — HEPARIN SODIUM 5000 UNIT(S): 5000 INJECTION INTRAVENOUS; SUBCUTANEOUS at 14:06

## 2020-11-20 RX ADMIN — AMLODIPINE BESYLATE 10 MILLIGRAM(S): 2.5 TABLET ORAL at 05:47

## 2020-11-20 NOTE — DISCHARGE NOTE PROVIDER - HOSPITAL COURSE
70yo patient presented for RALP on 11/19. Patient tolerated procedure well. Patient had PATRIZIA drain removed 11/20. Patient's VSS and he is hemodynamically stable. Patient is optimized for discharge.

## 2020-11-20 NOTE — DISCHARGE NOTE PROVIDER - NSDCFUADDINST_GEN_ALL_CORE_FT
Herrera Catheter Instructions:    - Please care for and empty urinary catheter bag as instructed by nurse.      General Discharge Instructions:    Please resume all regular home medications unless specifically advised not to take a particular medication. Also, please take any new medications as prescribed.    Please get plenty of rest, continue to ambulate several times per day, and drink adequate amounts of fluids.       Warning Signs:    Please call your doctor if you experience the following:    *You experience new chest pain, pressure, squeezing or tightness.    *New or worsening cough, shortness of breath, or wheeze.    *If you are vomiting and cannot keep down fluids or your medications.    *You are getting dehydrated due to continued vomiting, diarrhea, or other reasons. Signs of dehydration include dry mouth, rapid heartbeat, or feeling dizzy or faint when standing.    *You see blood or dark/black material when you vomit or have a bowel movement.    *You experience burning when you urinate, have blood in your urine, or experience a discharge.    *Your pain is not improving within 8-12 hours or is not gone within 24 hours. Call or return immediately if your pain is getting worse, changes location, or moves to your chest or back.    *You have shaking chills, or fever greater than 100.4 degrees Fahrenheit.    *Any change in your symptoms, or any new symptoms that concern you.

## 2020-11-20 NOTE — DISCHARGE NOTE PROVIDER - CARE PROVIDER_API CALL
Remy Lau)  Urology  [address], [facility] [place], [place] [address]  Phone: [phone]  Fax: [phone]  Follow Up Time:

## 2020-11-20 NOTE — PROGRESS NOTE ADULT - SUBJECTIVE AND OBJECTIVE BOX
INTERVAL HPI/OVERNIGHT EVENTS:  Single post operative low grade fever to 100.2. Afebrile since. No acute events overnight.    VITALS:    T(F): 99.3 (11-20-20 @ 05:52), Max: 100.2 (11-19-20 @ 22:28)  HR: 71 (11-20-20 @ 05:52) (63 - 81)  BP: 133/75 (11-20-20 @ 05:52) (110/55 - 143/80)  RR: 18 (11-20-20 @ 05:52) (16 - 50)  SpO2: 99% (11-20-20 @ 05:52) (97% - 100%)  Wt(kg): --    I&O's Detail    19 Nov 2020 07:01  -  20 Nov 2020 06:17  --------------------------------------------------------  IN:    Lactated Ringers: 1320 mL  Total IN: 1320 mL    OUT:    Bulb (mL): 150 mL    Indwelling Catheter - Urethral (mL): 2300 mL  Total OUT: 2450 mL    Total NET: -1130 mL          MEDICATIONS:    ANTIBIOTICS:      PAIN CONTROL:  acetaminophen   Tablet .. 650 milliGRAM(s) Oral every 6 hours PRN  acetaminophen   Tablet .. 1000 milliGRAM(s) Oral once  ondansetron Injectable 4 milliGRAM(s) IV Push every 6 hours PRN  oxycodone    5 mG/acetaminophen 325 mG 1 Tablet(s) Oral every 4 hours PRN  oxycodone    5 mG/acetaminophen 325 mG 2 Tablet(s) Oral every 6 hours PRN       MEDS:  oxybutynin 5 milliGRAM(s) Oral every 8 hours PRN      HEME/ONC  heparin   Injectable 5000 Unit(s) SubCutaneous every 8 hours        PHYSICAL EXAM:  General: No acute distress.  Alert and Oriented  Abdominal Exam: soft, appropriately distended, minimal tenderness, incisions dry and clean, PATRIZIA with sanguinous output   : licea catheter in place, hematuria+      LABS:                        11.5   17.78 )-----------( 281      ( 19 Nov 2020 18:18 )             35.1     11-19    141  |  102  |  20  ----------------------------<  198<H>  3.4<L>   |  19<L>  |  1.20    Ca    8.8      19 Nov 2020 18:18  Phos  4.6     11-19  Mg     1.8     11-19

## 2020-11-20 NOTE — DISCHARGE NOTE PROVIDER - NSDCMRMEDTOKEN_GEN_ALL_CORE_FT
amLODIPine 10 mg oral tablet: 1 tab(s) orally once a day  atorvastatin 20 mg oral tablet: 1 tab(s) orally once a day  oxybutynin 5 mg oral tablet: 1 tab(s) orally every 8 hours, As Needed -Bladder spasms - for bladder spasms MDD:3 tablets

## 2020-11-20 NOTE — DISCHARGE NOTE NURSING/CASE MANAGEMENT/SOCIAL WORK - PATIENT PORTAL LINK FT
You can access the FollowMyHealth Patient Portal offered by [facility] by registering at the following website: [email or web address]. By joining [facility]’s FollowMyHealth portal, you will also be able to view your health information using other applications (apps) compatible with our system.

## 2020-11-20 NOTE — DISCHARGE NOTE PROVIDER - NSDCCPCAREPLAN_GEN_ALL_CORE_FT
PRINCIPAL DISCHARGE DIAGNOSIS  Diagnosis: Prostate CA  Assessment and Plan of Treatment: Prostate CA      SECONDARY DISCHARGE DIAGNOSES  Diagnosis: Essential hypertension  Assessment and Plan of Treatment: Essential hypertension    Diagnosis: Hyperlipidemia  Assessment and Plan of Treatment: Hyperlipidemia

## 2020-11-23 PROBLEM — K46.9 UNSPECIFIED ABDOMINAL HERNIA WITHOUT OBSTRUCTION OR GANGRENE: Chronic | Status: ACTIVE | Noted: 2020-11-18

## 2020-11-23 PROBLEM — I10 ESSENTIAL (PRIMARY) HYPERTENSION: Chronic | Status: ACTIVE | Noted: 2020-11-18

## 2020-11-23 PROBLEM — C61 MALIGNANT NEOPLASM OF PROSTATE: Chronic | Status: ACTIVE | Noted: 2020-11-19

## 2020-11-23 PROBLEM — E78.5 HYPERLIPIDEMIA, UNSPECIFIED: Chronic | Status: ACTIVE | Noted: 2020-11-18

## 2020-11-23 LAB — SURGICAL PATHOLOGY STUDY: SIGNIFICANT CHANGE UP

## 2020-11-29 DIAGNOSIS — C61 MALIGNANT NEOPLASM OF PROSTATE: ICD-10-CM

## 2020-11-29 DIAGNOSIS — I10 ESSENTIAL (PRIMARY) HYPERTENSION: ICD-10-CM

## 2020-11-29 DIAGNOSIS — E78.5 HYPERLIPIDEMIA, UNSPECIFIED: ICD-10-CM

## 2020-11-29 DIAGNOSIS — Z87.891 PERSONAL HISTORY OF NICOTINE DEPENDENCE: ICD-10-CM

## 2020-11-30 ENCOUNTER — APPOINTMENT (OUTPATIENT)
Dept: UROLOGY | Facility: CLINIC | Age: 70
End: 2020-11-30
Payer: MEDICARE

## 2020-11-30 VITALS — HEART RATE: 94 BPM | TEMPERATURE: 97.9 F | SYSTOLIC BLOOD PRESSURE: 131 MMHG | DIASTOLIC BLOOD PRESSURE: 78 MMHG

## 2020-11-30 PROCEDURE — 99024 POSTOP FOLLOW-UP VISIT: CPT

## 2020-11-30 NOTE — ASSESSMENT
[FreeTextEntry1] : 71yo male with h/o GG 1 prostate cancer diagnosed 2016 on active surveillance\par MRI which PI-RADS 4 lesion. \par Repeat biopsy shows GG 2, high volume disease\par s/p RALP, PLND 11/19/20\par Final path reviewed: pT2N0, GG2, negative margins\par Kegels reviewed\par F/u 6 weeks for PSA

## 2020-11-30 NOTE — HISTORY OF PRESENT ILLNESS
Call to the pt.  Talked to his wife, Maria Eugenia.  She was very sorry to cancel.  Told her it is understandable, due to the weather, we have had other cancellations.  Pt rescheduled to Thursday 1/16/20 at 10am.  Pt to arrive at 9:45am to register on the 4th floor and see Dr. Taylor at 11am.  Maria Eugenia verbally understands.  A copy of the test order was given to Tea hernandez.   [FreeTextEntry1] : 68yo male referred by Dr. Montiel for evaluation for MRI fusion biopsy. 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 PSA has been trending up, now 10. Had MRI which shows 47gm prostate, 6mm PI-RADS 4 lesion. \par \par 9/30/20 Here for f/u. Had MRI fusion biopsy. Biopsy shows 5/13 cores positive, Reena 3+4 disease, up to 70% core involved. No complications from biopsy. \par \par 10/14/20 Here for f/u. Patient is interested in proceeding with surgery, here for surgery discussion. \par \par 11/30/20 Here for postop visit. Underwent RALP, PNLD 11/19. Doing well. Final path: pT2N0, Gl 3+4, negative margins.  [None] : None

## 2020-11-30 NOTE — PHYSICAL EXAM
[General Appearance - Well Developed] : well developed [General Appearance - Well Nourished] : well nourished [Normal Appearance] : normal appearance [Well Groomed] : well groomed [General Appearance - In No Acute Distress] : no acute distress [Abdomen Soft] : soft [Abdomen Tenderness] : non-tender [Costovertebral Angle Tenderness] : no ~M costovertebral angle tenderness [FreeTextEntry1] : Herrera removed [Oriented To Time, Place, And Person] : oriented to person, place, and time [Affect] : the affect was normal [Mood] : the mood was normal [Not Anxious] : not anxious [Normal Station and Gait] : the gait and station were normal for the patient's age [No Focal Deficits] : no focal deficits

## 2021-01-05 ENCOUNTER — APPOINTMENT (OUTPATIENT)
Dept: UROLOGY | Facility: CLINIC | Age: 71
End: 2021-01-05
Payer: MEDICARE

## 2021-01-05 VITALS — HEART RATE: 87 BPM | TEMPERATURE: 98.4 F | SYSTOLIC BLOOD PRESSURE: 130 MMHG | DIASTOLIC BLOOD PRESSURE: 82 MMHG

## 2021-01-05 PROCEDURE — 99072 ADDL SUPL MATRL&STAF TM PHE: CPT

## 2021-01-05 PROCEDURE — 99213 OFFICE O/P EST LOW 20 MIN: CPT | Mod: 24

## 2021-01-05 NOTE — ASSESSMENT
[FreeTextEntry1] : ED after RP\par reviewed postop erectile recovery\par trial viagra 100\par PSA today\par \par BLU\par continue kegels\par \par prostate cancer\par f/u with feuerstein\par PSA tdoay

## 2021-01-05 NOTE — HISTORY OF PRESENT ILLNESS
[FreeTextEntry1] : s/p RALP with Dylonstein pT2N0 negative margins\par 1ppd\par doing kegels\par EF 0/10\par interest in sexaul function

## 2021-01-06 LAB — PSA SERPL-MCNC: 0.23 NG/ML

## 2021-01-11 ENCOUNTER — APPOINTMENT (OUTPATIENT)
Dept: UROLOGY | Facility: CLINIC | Age: 71
End: 2021-01-11
Payer: MEDICARE

## 2021-01-11 VITALS
DIASTOLIC BLOOD PRESSURE: 76 MMHG | SYSTOLIC BLOOD PRESSURE: 119 MMHG | TEMPERATURE: 97.8 F | BODY MASS INDEX: 26.48 KG/M2 | HEART RATE: 84 BPM | WEIGHT: 185 LBS | HEIGHT: 70 IN

## 2021-01-11 PROCEDURE — 99024 POSTOP FOLLOW-UP VISIT: CPT

## 2021-01-11 RX ORDER — CEFPODOXIME PROXETIL 100 MG/1
100 TABLET, FILM COATED ORAL
Qty: 6 | Refills: 0 | Status: DISCONTINUED | COMMUNITY
Start: 2018-01-16 | End: 2021-01-11

## 2021-01-11 RX ORDER — ENEMA 19; 7 G/133ML; G/133ML
7-19 ENEMA RECTAL
Qty: 1 | Refills: 0 | Status: DISCONTINUED | COMMUNITY
Start: 2018-01-16 | End: 2021-01-11

## 2021-01-11 NOTE — ASSESSMENT
[FreeTextEntry1] : 69yo male with h/o GG 1 prostate cancer diagnosed 2016 on active surveillance\par MRI which PI-RADS 4 lesion. \par Repeat biopsy shows GG 2, high volume disease\par s/p RALP, PLND 11/19/20\par Final path reviewed: pT2N0, GG2, negative margins\par Recovering appropriately\par PSA = 0.23, needs repeat in 4 weeks

## 2021-01-11 NOTE — PHYSICAL EXAM
[General Appearance - Well Developed] : well developed [General Appearance - Well Nourished] : well nourished [Normal Appearance] : normal appearance [Well Groomed] : well groomed [General Appearance - In No Acute Distress] : no acute distress [Abdomen Soft] : soft [Abdomen Tenderness] : non-tender [Costovertebral Angle Tenderness] : no ~M costovertebral angle tenderness [FreeTextEntry1] : incisions healing well [Affect] : the affect was normal [Oriented To Time, Place, And Person] : oriented to person, place, and time [Mood] : the mood was normal [Not Anxious] : not anxious [Normal Station and Gait] : the gait and station were normal for the patient's age [No Focal Deficits] : no focal deficits

## 2021-01-11 NOTE — HISTORY OF PRESENT ILLNESS
[FreeTextEntry1] : 70yo male referred by Dr. Montiel for evaluation for MRI fusion biopsy. 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 PSA has been trending up, now 10. Had MRI which shows 47gm prostate, 6mm PI-RADS 4 lesion. \par \par 9/30/20 Here for f/u. Had MRI fusion biopsy. Biopsy shows 5/13 cores positive, Reena 3+4 disease, up to 70% core involved. No complications from biopsy. \par \par 10/14/20 Here for f/u. Patient is interested in proceeding with surgery, here for surgery discussion. \par \par 11/30/20 Here for postop visit. Underwent RALP, PNLD 11/19. Doing well. Final path: pT2N0, Gl 3+4, negative margins. \par \par 1/11/21 Here for postop visit. Had PSA last week = 0.23. 1 pad/day. Started Viagra last week had moderate response.  [Urinary Incontinence] : urinary incontinence [Erectile Dysfunction] : Erectile Dysfunction [None] : None

## 2021-02-10 ENCOUNTER — APPOINTMENT (OUTPATIENT)
Dept: UROLOGY | Facility: CLINIC | Age: 71
End: 2021-02-10
Payer: MEDICARE

## 2021-02-10 VITALS — DIASTOLIC BLOOD PRESSURE: 92 MMHG | HEART RATE: 82 BPM | SYSTOLIC BLOOD PRESSURE: 157 MMHG | TEMPERATURE: 97.9 F

## 2021-02-10 PROCEDURE — 99024 POSTOP FOLLOW-UP VISIT: CPT

## 2021-02-10 NOTE — HISTORY OF PRESENT ILLNESS
[FreeTextEntry1] : 70yo male referred by Dr. Montiel for evaluation for MRI fusion biopsy. 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 PSA has been trending up, now 10. Had MRI which shows 47gm prostate, 6mm PI-RADS 4 lesion. \par \par 9/30/20 Here for f/u. Had MRI fusion biopsy. Biopsy shows 5/13 cores positive, Reena 3+4 disease, up to 70% core involved. No complications from biopsy. \par \par 10/14/20 Here for f/u. Patient is interested in proceeding with surgery, here for surgery discussion. \par \par 11/30/20 Here for postop visit. Underwent RALP, PNLD 11/19. Doing well. Final path: pT2N0, Gl 3+4, negative margins. \par \par 1/11/21 Here for postop visit. Had PSA last week = 0.23. 1 pad/day. Started Viagra last week had moderate response. \par \par 2/10/21 Here for f/u. 6 week postop PSA was detectable, returns to repeat PSA today. No erections yet. Experiencing mild incontinence, wears 1 pad per day.  [Urinary Incontinence] : urinary incontinence [Erectile Dysfunction] : Erectile Dysfunction [None] : None

## 2021-02-10 NOTE — ASSESSMENT
[FreeTextEntry1] : 71yo male with h/o GG 1 prostate cancer diagnosed 2016 on active surveillance\par MRI which PI-RADS 4 lesion. \par Repeat biopsy shows GG 2, high volume disease\par s/p RALP, PLND 11/19/20\par Final path reviewed: pT2N0, GG2, negative margins\par 6 week postop PSA = 0.23, will repeat today\par Stress incontinence s/p RALP, continue Kegels\par ED s/p RALP, continue Viagra prn\par If PSA undetectable, return in 3 months

## 2021-02-10 NOTE — PHYSICAL EXAM
[General Appearance - Well Developed] : well developed [General Appearance - Well Nourished] : well nourished [Normal Appearance] : normal appearance [Well Groomed] : well groomed [General Appearance - In No Acute Distress] : no acute distress [Abdomen Soft] : soft [Abdomen Tenderness] : non-tender [Costovertebral Angle Tenderness] : no ~M costovertebral angle tenderness [FreeTextEntry1] : incisions healing well [Oriented To Time, Place, And Person] : oriented to person, place, and time [Affect] : the affect was normal [Mood] : the mood was normal [Not Anxious] : not anxious [Normal Station and Gait] : the gait and station were normal for the patient's age [No Focal Deficits] : no focal deficits

## 2021-02-12 LAB — PSA SERPL-MCNC: 0.25 NG/ML

## 2021-03-19 ENCOUNTER — RESULT REVIEW (OUTPATIENT)
Age: 71
End: 2021-03-19

## 2021-03-19 ENCOUNTER — OUTPATIENT (OUTPATIENT)
Dept: OUTPATIENT SERVICES | Facility: HOSPITAL | Age: 71
LOS: 1 days | End: 2021-03-19
Payer: MEDICARE

## 2021-03-19 DIAGNOSIS — Z98.890 OTHER SPECIFIED POSTPROCEDURAL STATES: Chronic | ICD-10-CM

## 2021-03-19 LAB — GLUCOSE BLDC GLUCOMTR-MCNC: 109 MG/DL — HIGH (ref 70–99)

## 2021-03-19 PROCEDURE — 82962 GLUCOSE BLOOD TEST: CPT

## 2021-03-19 PROCEDURE — 78815 PET IMAGE W/CT SKULL-THIGH: CPT

## 2021-03-19 PROCEDURE — 78815 PET IMAGE W/CT SKULL-THIGH: CPT | Mod: 26,PI

## 2021-03-19 PROCEDURE — A9588: CPT

## 2021-04-06 ENCOUNTER — APPOINTMENT (OUTPATIENT)
Dept: UROLOGY | Facility: CLINIC | Age: 71
End: 2021-04-06
Payer: MEDICARE

## 2021-04-06 VITALS — HEART RATE: 86 BPM | SYSTOLIC BLOOD PRESSURE: 144 MMHG | TEMPERATURE: 97.5 F | DIASTOLIC BLOOD PRESSURE: 81 MMHG

## 2021-04-06 PROCEDURE — 99072 ADDL SUPL MATRL&STAF TM PHE: CPT

## 2021-04-06 PROCEDURE — 99213 OFFICE O/P EST LOW 20 MIN: CPT | Mod: 24

## 2021-04-06 NOTE — HISTORY OF PRESENT ILLNESS
[Urinary Incontinence] : urinary incontinence [Post-Void Dribbling] : post-void dribbling [Erectile Dysfunction] : Erectile Dysfunction [None] : None [FreeTextEntry1] : This is a 70 year old male with PMH of HTN, HDL, hematochezia, and prostate Ca, here for followup visit s/p \par RALP with Dylonstein pT2NO with neg findings. Complaints on ED - on sildenefil 100 mg.\par Diagnosed around 2016 with GG 1 PCa, subsequent biopsies negative. \par Patient reports urinary incontinence X 1 pad per day, post voiding dribble. Good FOS.\par persistent PSA after RALP\par PSA 0.25 (Feb 12, 2021) \par         0.23 (Jan 2021)\par \par PET/ CT (Mar 19 2021) \par  Normal fluiclovine PET scan. No evidence of recurrent or metastatic disease. \par No other complaints at this time. \par  [Urinary Retention] : no urinary retention [Urinary Urgency] : no urinary urgency [Urinary Frequency] : no urinary frequency [Nocturia] : no nocturia [Straining] : no straining [Weak Stream] : no weak stream [Intermittency] : no intermittency [Dysuria] : no dysuria [Hematuria - Gross] : no gross hematuria [Hematuria - Microscopic] : no microscopic hematuria [Bladder Spasm] : no bladder spasm [Abdominal Pain] : no abdominal pain [Flank Pain] : no flank pain [Edema] : ~T edema was not present

## 2021-04-06 NOTE — ASSESSMENT
[FreeTextEntry1] : # Prostate CA: \par -For PET/Alum scan Eval with Dr Lau\par - PSA monitoring follow up with Dr Lau for management - decision making re RT\par \par #Erectile dysfunction\par -Sildenafil 100 mg tab prn \par -Discussed with patient other treatment options for ED management. \par -Discussed with patient injectable ED management - patient is interested. \par if fails sildenafil may call to schedule for penile ultrasound\par \par #Urinary incontinence\par -Kegel exercise set of 10  three times a day\par \par Follow up in 3 months.

## 2021-04-07 ENCOUNTER — APPOINTMENT (OUTPATIENT)
Dept: UROLOGY | Facility: CLINIC | Age: 71
End: 2021-04-07
Payer: MEDICARE

## 2021-04-07 VITALS — SYSTOLIC BLOOD PRESSURE: 143 MMHG | HEART RATE: 79 BPM | DIASTOLIC BLOOD PRESSURE: 92 MMHG | TEMPERATURE: 97.2 F

## 2021-04-07 PROCEDURE — 99072 ADDL SUPL MATRL&STAF TM PHE: CPT

## 2021-04-07 PROCEDURE — 99214 OFFICE O/P EST MOD 30 MIN: CPT

## 2021-04-07 NOTE — PHYSICAL EXAM
[General Appearance - Well Developed] : well developed [General Appearance - Well Nourished] : well nourished [Normal Appearance] : normal appearance [Well Groomed] : well groomed [General Appearance - In No Acute Distress] : no acute distress [Abdomen Soft] : soft [Abdomen Tenderness] : non-tender [Costovertebral Angle Tenderness] : no ~M costovertebral angle tenderness [Oriented To Time, Place, And Person] : oriented to person, place, and time [Affect] : the affect was normal [Mood] : the mood was normal [Not Anxious] : not anxious [Normal Station and Gait] : the gait and station were normal for the patient's age [No Focal Deficits] : no focal deficits [FreeTextEntry1] : incisions healing well

## 2021-04-07 NOTE — ASSESSMENT
[FreeTextEntry1] : 71yo male with h/o GG 1 prostate cancer diagnosed 2016 on active surveillance\par MRI which PI-RADS 4 lesion. \par Repeat biopsy shows GG 2, high volume disease\par s/p RALP, PLND 11/19/20\par Final path reviewed: pT2N0, GG2, negative margins\par 6 week postop PSA = 0.23, 3 month postop PSA = 0.25\par Reviewed PET Axumin, no radiographic evidence of recurrence\par Repeat PSA today\par Discussed salvage radiation if PSA is still detectable\par Stress incontinence s/p RALP, continue Kegels\par ED s/p RALP, continue Viagra prn

## 2021-04-07 NOTE — HISTORY OF PRESENT ILLNESS
[Urinary Incontinence] : urinary incontinence [Erectile Dysfunction] : Erectile Dysfunction [None] : None [FreeTextEntry1] : 70yo male referred by Dr. Montiel for evaluation for MRI fusion biopsy. 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 PSA has been trending up, now 10. Had MRI which shows 47gm prostate, 6mm PI-RADS 4 lesion. \par \par 9/30/20 Here for f/u. Had MRI fusion biopsy. Biopsy shows 5/13 cores positive, Reena 3+4 disease, up to 70% core involved. No complications from biopsy. \par \par 10/14/20 Here for f/u. Patient is interested in proceeding with surgery, here for surgery discussion. \par \par 11/30/20 Here for postop visit. Underwent RALP, PNLD 11/19. Doing well. Final path: pT2N0, Gl 3+4, negative margins. \par \par 1/11/21 Here for postop visit. Had PSA last week = 0.23. 1 pad/day. Started Viagra last week had moderate response. \par \par 2/10/21 Here for f/u. 6 week postop PSA was detectable, returns to repeat PSA today. No erections yet. Experiencing mild incontinence, wears 1 pad per day. \par \par 4/07/21 Here for f/u. 3 month postop PSA was detectable, 0.25, not really consistent with his path pT2N0, 3+4 with negative margins. PET Axumin was performed which is negative. Wearing 1 pad/day. +ED.

## 2021-04-08 ENCOUNTER — NON-APPOINTMENT (OUTPATIENT)
Age: 71
End: 2021-04-08

## 2021-04-08 LAB — PSA SERPL-MCNC: 0.26 NG/ML

## 2021-04-13 ENCOUNTER — APPOINTMENT (OUTPATIENT)
Dept: RADIATION ONCOLOGY | Facility: CLINIC | Age: 71
End: 2021-04-13
Payer: MEDICARE

## 2021-04-13 VITALS
HEART RATE: 69 BPM | WEIGHT: 185 LBS | TEMPERATURE: 98 F | DIASTOLIC BLOOD PRESSURE: 87 MMHG | OXYGEN SATURATION: 99 % | BODY MASS INDEX: 26.54 KG/M2 | RESPIRATION RATE: 18 BRPM | SYSTOLIC BLOOD PRESSURE: 135 MMHG

## 2021-04-13 DIAGNOSIS — Z80.0 FAMILY HISTORY OF MALIGNANT NEOPLASM OF DIGESTIVE ORGANS: ICD-10-CM

## 2021-04-13 PROCEDURE — 99072 ADDL SUPL MATRL&STAF TM PHE: CPT

## 2021-04-13 PROCEDURE — 99204 OFFICE O/P NEW MOD 45 MIN: CPT | Mod: 25

## 2021-04-13 RX ORDER — AMLODIPINE BESYLATE 5 MG/1
TABLET ORAL
Refills: 0 | Status: ACTIVE | COMMUNITY

## 2021-04-13 RX ORDER — ATORVASTATIN CALCIUM 80 MG/1
TABLET, FILM COATED ORAL
Refills: 0 | Status: ACTIVE | COMMUNITY

## 2021-04-13 NOTE — PHYSICAL EXAM
[] : no respiratory distress [Respiration, Rhythm And Depth] : normal respiratory rhythm and effort [Auscultation Breath Sounds / Voice Sounds] : lungs were clear to auscultation bilaterally [Heart Rate And Rhythm] : heart rate and rhythm were normal [Heart Sounds] : normal S1 and S2 [Normal] : oriented to person, place and time, the affect was normal, the mood was normal and not anxious

## 2021-04-13 NOTE — HISTORY OF PRESENT ILLNESS
[FreeTextEntry1] : Mr. Armand Love is a 70 year old male referred by Dr. Lau for consideration of post- prostatectomy radiation therapy for pT2N0 adenocarcinoma of the prostate. He is s/p robot-assisted radical prostatectomy with pelvic lymph node dissection on 11/19/2020, which revealed Scarborough score 7 (3+4), 0/4 nodes, negative margins. His post-op PSA's have ranged from 0.23- 0.26 ng/mL. \par \par He established care with Dr. Montiel on 10/17/17, and as per Dr. Montiel's note, he had a history of Reena 6 prostate cancer with last biopsy August 2016 and undergoing Active Surveillance. \par \par PSA was 4.96 ng/mL on 10/17/17. TRUS guided prostate biopsy done 3/13/18 showed the following pathology: \par 1. Right prostate: Focal high grade PIN.\par 2. Left prostate: Two small foci suspicious for prostatic adenocarcinoma however outpouchings of high grade PIN\par cannot be entirely ruled out. PIN4 immunohistochemistry shows an absence of basal cells in the suspicious foci (absence of staining with K903 and p63) and positivity with p504s supporting this diagnosis.\par \par Prostate MRI done 10/30/17 showed the following: \par -Prostate size 5 x 4.4 x 2.9 cm, with volume of 33.2 mL. \par -With respect to peripheral zone PI-RADS 1. \par -With respect to the central gland/ transition zone PI-RADS 2. \par \par PSA trend:\par 9/13/18- 6.95 ng/mL\par 3/12/19- 6.75 ng/mL\par \par Prostate MRI done 4/1/19 showed the following: \par -Prostate measures 4 x 5.1 x 4.2 cm, with volume of 43 mL. \par -PI-RADS 2. \par \par PSA trend: \par 1/7/2020- 9.79 ng/mL\par 7/9/2020- 10.4 ng/mL\par \par Prostate MRI done 7/30/2020 revealed the following: \par -Prostate size 4.5 x 3.8 x 5.3 cm. \par -Subcentimeter lesion in the lateral/posterolateral right peripheral zone at the base. PI-RADS 4. \par -No extracapsular extension, seminal vesicle invasion, pelvic lymphadenopathy, or bony metastases. \par \par Fusion biopsy done 9/15/2020 showed 5 of 13 cores positive for disease:\par 1. Left anterior apex: Benign. \par 2. Left anterior base: Benign. \par 3. Right anterior apex: Benign. \par 4. Right anterior base: Prostatic adenocarcinoma, Reena 3+3, GG1, involving less than 5% of the tissue. High-grade PIN present.\par 5., Midline apex: Benign. \par 6. Midline, base: Benign. \par 7. Left posterior apex: Benign. \par 8. Left posterior base: Benign. \par 9. Right posterior apex: Prostatic adenocarcinoma, Reena 3+3, GG1, involving 50% of the tissue.\par 10. Right posterior base: Prostatic adenocarcinoma, Reena 3+4, GG2, involving 75% of the tissue discontinuously.\par 11. Left lateral: Benign. \par 12. Right lateral: Prostatic adenocarcinoma, [place] 3+3, GG1, involving 10% of the tissue.\par 13. MRI lesion: Prostatic adenocarcinoma, Reena 3+3,  GG 1 involving discontinuously 20% and 50%  of two of three cores.\par \par On 11/19/2020, he underwent robot-assisted radical prostatectomy with pelvic lymph node dissection. Pathology showed the following: \par 1. Anterior adipose tissue: Adipose and fibrous tissue negative for tumor.\par 2. Radical prostatectomy: Prostatic adenocarcinoma Reena's pattern 3+4, GG2.\par Extraprostatic extension: Not identified. Urinary bladder, neck, invasion: Not identified. Seminal vesicle invasion: Not identified. All margins uninvolved by invasive carcinoma.\par 3. Left pelvic lymph node: 0/2. \par 4. Right pelvic lymph node: 0/2. \par Pathologic stage classification AJCC eighth edition: pT2N0.\par \par His PSA was 0.23 ng/mL on 1/5/21. His PSA was 0.25 ng/mL on 2/10/21.\par \par PET/CT Axumin done 3/19/21 showed the following: \par -Normal fluciclovine PET scan. No evidence of recurrent or metastatic disease could be identified on this study.\par \par His PSA was 0.26 ng/mL on 4/7/21. \par \par Today, he notes he feels generally well, but continues to have urinary dribbling and stress incontinence. He wears adult diapers- two/ day. Also notes nocturia x1. He is unable to have erections, has tried SIldenafil without success. Denies history of hemorrhoids. Last colonoscopy was in 2016, unsure when he is due for the next one. He denies history of radiation.\par \par Family history notable for mom with colon cancer.\par \par Patient is a current smoker. He lives in [place] with his wife. He is a retired .

## 2021-04-13 NOTE — REVIEW OF SYSTEMS
[IPSS Score (0-40): ___] : IPSS score: [unfilled] [EPIC-CP Score (0-60): ___] : EPIC-CP score: [unfilled] [Negative] : Allergic/Immunologic [FreeTextEntry7] : Last colonoscopy 2016 [FreeTextEntry8] : see HPI

## 2021-04-13 NOTE — DISEASE MANAGEMENT
[] : Patient had a Prostate MRI [4] : 4 [IIB] : IIB [Patient had a radical prostatectomy] : Patient had a radical prostatectomy  [7(3+4)] : Reena Score 7(3+4) [Negative] : Negative margins [0] : N0 [2] : T2 [BiopsyDate] : 9/15/2020 [TotalCores] : 13 [TotalPositiveCores] : 5 [MaxCoreInvolvement] : 75 [RadicalProstatectomyDate] : 11/19/2020 [TotalNumberofnodesresected] : 4 [PositiveNodes] : 0

## 2021-04-14 ENCOUNTER — NON-APPOINTMENT (OUTPATIENT)
Age: 71
End: 2021-04-14

## 2021-04-21 ENCOUNTER — APPOINTMENT (OUTPATIENT)
Dept: UROLOGY | Facility: CLINIC | Age: 71
End: 2021-04-21
Payer: MEDICARE

## 2021-04-21 ENCOUNTER — MED ADMIN CHARGE (OUTPATIENT)
Age: 71
End: 2021-04-21

## 2021-04-21 VITALS — SYSTOLIC BLOOD PRESSURE: 123 MMHG | TEMPERATURE: 97.9 F | HEART RATE: 81 BPM | DIASTOLIC BLOOD PRESSURE: 78 MMHG

## 2021-04-21 PROCEDURE — 99213 OFFICE O/P EST LOW 20 MIN: CPT | Mod: 25

## 2021-04-21 PROCEDURE — 96402 CHEMO HORMON ANTINEOPL SQ/IM: CPT

## 2021-04-21 PROCEDURE — 99072 ADDL SUPL MATRL&STAF TM PHE: CPT

## 2021-04-21 RX ORDER — LEUPROLIDE ACETATE 22.5 MG
22.5 KIT INTRAMUSCULAR
Qty: 1 | Refills: 0 | Status: COMPLETED | OUTPATIENT
Start: 2021-04-21

## 2021-04-21 RX ADMIN — LEUPROLIDE ACETATE 0 MG: KIT at 00:00

## 2021-04-21 NOTE — HISTORY OF PRESENT ILLNESS
[Urinary Incontinence] : urinary incontinence [Erectile Dysfunction] : Erectile Dysfunction [None] : None [FreeTextEntry1] : 68yo male referred by Dr. Montiel for evaluation for MRI fusion biopsy. 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 PSA has been trending up, now 10. Had MRI which shows 47gm prostate, 6mm PI-RADS 4 lesion. \par \par 9/30/20 Here for f/u. Had MRI fusion biopsy. Biopsy shows 5/13 cores positive, Reena 3+4 disease, up to 70% core involved. No complications from biopsy. \par \par 10/14/20 Here for f/u. Patient is interested in proceeding with surgery, here for surgery discussion. \par \par 11/30/20 Here for postop visit. Underwent RALP, PNLD 11/19. Doing well. Final path: pT2N0, Gl 3+4, negative margins. \par \par 1/11/21 Here for postop visit. Had PSA last week = 0.23. 1 pad/day. Started Viagra last week had moderate response. \par \par 2/10/21 Here for f/u. 6 week postop PSA was detectable, returns to repeat PSA today. No erections yet. Experiencing mild incontinence, wears 1 pad per day. \par \par 4/07/21 Here for f/u. 3 month postop PSA was detectable, 0.25, not really consistent with his path pT2N0, 3+4 with negative margins. PET Axumin was performed which is negative. Wearing 1 pad/day. +ED. \par \par 4/21/21 Here to discuss ADT. Wearing 1 pad/day. Plan for salvage radiation when incontinence improves.

## 2021-04-21 NOTE — ASSESSMENT
[FreeTextEntry1] : 69yo male with h/o GG 1 prostate cancer diagnosed 2016 on active surveillance\par MRI which PI-RADS 4 lesion. \par Repeat biopsy shows GG 2, high volume disease\par s/p RALP, PLND 11/19/20\par Final path reviewed: pT2N0, GG2, negative margins\par 6 week postop PSA = 0.23, 3 month postop PSA = 0.25\par Reviewed PET Axumin, no radiographic evidence of recurrence\par Plan for salvage XRT + ADT\par Lupron given today\par Reviewed side effects\par F/u 6 weeks

## 2021-04-28 ENCOUNTER — NON-APPOINTMENT (OUTPATIENT)
Age: 71
End: 2021-04-28

## 2021-06-02 ENCOUNTER — APPOINTMENT (OUTPATIENT)
Dept: UROLOGY | Facility: CLINIC | Age: 71
End: 2021-06-02
Payer: MEDICARE

## 2021-06-02 VITALS — DIASTOLIC BLOOD PRESSURE: 83 MMHG | HEART RATE: 81 BPM | SYSTOLIC BLOOD PRESSURE: 136 MMHG | TEMPERATURE: 98 F

## 2021-06-02 PROCEDURE — 99213 OFFICE O/P EST LOW 20 MIN: CPT

## 2021-06-03 LAB — PSA SERPL-MCNC: <0.01 NG/ML

## 2021-06-03 NOTE — END OF VISIT
[FreeTextEntry3] : 69yo male s/p RALP with persistent PSA postop concerning for residual disease. Started ADT 6 weeks ago, tolerating well. Plan for salvage EBRT. Good urinary control, no pads. F/u 6 weeks for 2nd Lupron injection

## 2021-06-03 NOTE — HISTORY OF PRESENT ILLNESS
[None] : None [FreeTextEntry1] : 70yo male referred by Dr. Montiel for evaluation for MRI fusion biopsy. 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 PSA has been trending up, now 10. Had MRI which shows 47gm prostate, 6mm PI-RADS 4 lesion. \par \par 9/30/20 Here for f/u. Had MRI fusion biopsy. Biopsy shows 5/13 cores positive, [place] 3+4 disease, up to 70% core involved. No complications from biopsy. \par \par 10/14/20 Here for f/u. Patient is interested in proceeding with surgery, here for surgery discussion. \par \par 11/30/20 Here for postop visit. Underwent RALP, PNLD 11/19. Doing well. Final path: pT2N0, Gl 3+4, negative margins. \par \par 1/11/21 Here for postop visit. Had PSA last week = 0.23. 1 pad/day. Started Viagra last week had moderate response. \par \par 2/10/21 Here for f/u. 6 week postop PSA was detectable, returns to repeat PSA today. No erections yet. Experiencing mild incontinence, wears 1 pad per day. \par \par 4/07/21 Here for f/u. 3 month postop PSA was detectable, 0.25, not really consistent with his path pT2N0, 3+4 with negative margins. PET Axumin was performed which is negative. Wearing 1 pad/day. +ED. \par \par 4/21/21 Here to discuss ADT. Wearing 1 pad/day. Plan for salvage radiation when incontinence improves. \par \par 6/2/21: Here for f/u after 1st dose of ADT (4/21/21). Pt c/o hot flashes. Reports good energy levels. Still walking 2-3 miles. Urinary incontinence has improved. No longer needs pads. Per patient, before end of June he will start radiation treatment. \par

## 2021-06-03 NOTE — ASSESSMENT
[FreeTextEntry1] : 69yo male with h/o GG 1 prostate cancer diagnosed 2016 on active surveillance\par MRI which PI-RADS 4 lesion. \par Repeat biopsy shows GG 2, high volume disease\par s/p RALP, PLND 11/19/20\par Final path reviewed: pT2N0, GG2, negative margins\par 6 week postop PSA = 0.23, 3 month postop PSA = 0.25\par Reviewed PET Axumin, no radiographic evidence of recurrence\par Plan for salvage XRT + ADT, pt reports will most likely start end of June 2021\par s/p 1st Lupron shot 4/21, c/o hot flashes, good energy levels \par F/U in 6 weeks for second Lupron injection

## 2021-07-09 ENCOUNTER — APPOINTMENT (OUTPATIENT)
Dept: UROLOGY | Facility: CLINIC | Age: 71
End: 2021-07-09
Payer: MEDICARE

## 2021-07-09 VITALS — TEMPERATURE: 97.7 F | HEART RATE: 71 BPM | DIASTOLIC BLOOD PRESSURE: 88 MMHG | SYSTOLIC BLOOD PRESSURE: 137 MMHG

## 2021-07-09 PROCEDURE — 99213 OFFICE O/P EST LOW 20 MIN: CPT

## 2021-07-09 NOTE — ASSESSMENT
[FreeTextEntry1] : ED after RP\par patient discontinued Sildenafil, he stated he will resume after XRT and Lupron\par Discussed other treatment options\par If PDE5 fails, interested in ICI or IPP\par \par BLU\par continue kegel exercise\par improved symptoms, does not use pads anymore\par \par prostate cancer\par f/u with Sid on 7/14/2021\par \par follow up in 6 months\par \par

## 2021-07-09 NOTE — HISTORY OF PRESENT ILLNESS
[Urinary Incontinence] : urinary incontinence [Post-Void Dribbling] : post-void dribbling [Erectile Dysfunction] : Erectile Dysfunction [None] : None [FreeTextEntry1] : This is a 70 year old male with PMH of HTN, HDL, hematochezia, and prostate Ca,\par s/p RALP with Feuerstein pT2NO with neg findings. Diagnosed around 2016 with GG 1 PCa, \par subsequent biopsies negative. Had MRI 2020 which shows 47gm prostate, 6mm PI-RADS 4 lesion. \par First dose of ADT 4/21/2021. \par Here for follow up with  urinary incontinence and  ED - on sildenafil 100 mg\par Reports improved incontinence, post voiding dribble.\par Good FOS.\par Denies other urinary symptoms. \par \par \par \par PSA <0.01 June 2021\par         0.25 (Feb 12, 2021) \par        0.23 (Jan 2021)\par \par PET/ CT (Mar 19 2021) \par  Normal fluiclovine PET scan. No evidence of recurrent or metastatic disease. \par No other complaints at this time. \par  [Urinary Retention] : no urinary retention [Urinary Urgency] : no urinary urgency [Urinary Frequency] : no urinary frequency [Nocturia] : no nocturia [Straining] : no straining [Weak Stream] : no weak stream [Intermittency] : no intermittency [Dysuria] : no dysuria [Hematuria - Gross] : no gross hematuria [Hematuria - Microscopic] : no microscopic hematuria [Bladder Spasm] : no bladder spasm [Abdominal Pain] : no abdominal pain [Flank Pain] : no flank pain [Fever] : no fever [Fatigue] : no fatigue

## 2021-07-14 ENCOUNTER — MED ADMIN CHARGE (OUTPATIENT)
Age: 71
End: 2021-07-14

## 2021-07-14 ENCOUNTER — APPOINTMENT (OUTPATIENT)
Dept: UROLOGY | Facility: CLINIC | Age: 71
End: 2021-07-14
Payer: MEDICARE

## 2021-07-14 VITALS — HEART RATE: 76 BPM | TEMPERATURE: 97.3 F | DIASTOLIC BLOOD PRESSURE: 83 MMHG | SYSTOLIC BLOOD PRESSURE: 133 MMHG

## 2021-07-14 DIAGNOSIS — Z79.818 LONG TERM (CURRENT) USE OF OTHER AGENTS AFFECTING ESTROGEN RECEPTORS AND ESTROGEN LEVELS: ICD-10-CM

## 2021-07-14 PROCEDURE — 99214 OFFICE O/P EST MOD 30 MIN: CPT | Mod: 25

## 2021-07-14 PROCEDURE — 96402 CHEMO HORMON ANTINEOPL SQ/IM: CPT

## 2021-07-14 RX ORDER — LEUPROLIDE ACETATE 22.5 MG
22.5 KIT INTRAMUSCULAR
Qty: 1 | Refills: 0 | Status: COMPLETED | OUTPATIENT
Start: 2021-07-14

## 2021-07-14 RX ADMIN — LEUPROLIDE ACETATE 0 MG: KIT at 00:00

## 2021-07-14 NOTE — ASSESSMENT
[FreeTextEntry1] : 71yo male s/p RALP, PLND 11/19/20\par Final path pT2N0, GG2, negative margins\par 6 week postop PSA = 0.23, 3 month postop PSA = 0.25\par Reviewed PET Axumin, no radiographic evidence of recurrence\par Plan for salvage XRT + ADT 6 months\par 2nd 3 month injection given today\par Tolerating well\par F/u Rad Onc\par F/u 3 months

## 2021-07-14 NOTE — HISTORY OF PRESENT ILLNESS
[None] : None [FreeTextEntry1] : 68yo male referred by Dr. Montiel for evaluation for MRI fusion biopsy. 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 PSA has been trending up, now 10. Had MRI which shows 47gm prostate, 6mm PI-RADS 4 lesion. \par \par 9/30/20 Here for f/u. Had MRI fusion biopsy. Biopsy shows 5/13 cores positive, [place] 3+4 disease, up to 70% core involved. No complications from biopsy. \par \par 10/14/20 Here for f/u. Patient is interested in proceeding with surgery, here for surgery discussion. \par \par 11/30/20 Here for postop visit. Underwent RALP, PNLD 11/19. Doing well. Final path: pT2N0, Gl 3+4, negative margins. \par \par 1/11/21 Here for postop visit. Had PSA last week = 0.23. 1 pad/day. Started Viagra last week had moderate response. \par \par 2/10/21 Here for f/u. 6 week postop PSA was detectable, returns to repeat PSA today. No erections yet. Experiencing mild incontinence, wears 1 pad per day. \par \par 4/07/21 Here for f/u. 3 month postop PSA was detectable, 0.25, not really consistent with his path pT2N0, 3+4 with negative margins. PET Axumin was performed which is negative. Wearing 1 pad/day. +ED. \par \par 4/21/21 Here to discuss ADT. Wearing 1 pad/day. Plan for salvage radiation when incontinence improves. \par \par 6/2/21: Here for f/u after 1st dose of ADT (4/21/21). Pt c/o hot flashes. Reports good energy levels. Still walking 2-3 miles. Urinary incontinence has improved. No longer needs pads. Per patient, before end of June he will start radiation treatment. \par \par 7/14/21 Here for f/u for 2nd Lupron injection. Doing well. No pads. Using Viagra prn. Has not started radiation yet

## 2021-08-11 ENCOUNTER — NON-APPOINTMENT (OUTPATIENT)
Age: 71
End: 2021-08-11

## 2021-08-18 ENCOUNTER — NON-APPOINTMENT (OUTPATIENT)
Age: 71
End: 2021-08-18

## 2021-08-19 NOTE — REVIEW OF SYSTEMS
[Anal Pain: Grade 0] : Anal Pain: Grade 0 [Diarrhea: Grade 0] : Diarrhea: Grade 0 [Fecal Incontinence: Grade 0] : Fecal Incontinence: Grade 0 [Urinary Incontinence: Grade 0] : Urinary Incontinence: Grade 0  [Hematuria: Grade 0] : Hematuria: Grade 0 [Urinary Tract Pain: Grade 0] : Urinary Tract Pain: Grade 0 [Urinary Urgency: Grade 0] : Urinary Urgency: Grade 0 [Urinary Frequency: Grade 0] : Urinary Frequency: Grade 0

## 2021-08-25 ENCOUNTER — NON-APPOINTMENT (OUTPATIENT)
Age: 71
End: 2021-08-25

## 2021-08-29 NOTE — DISEASE MANAGEMENT
[Pathological] : TNM Stage: p [IIB] : IIB [] : Patient had a Prostate MRI [4] : 4 [Patient had a radical prostatectomy] : Patient had a radical prostatectomy  [7(3+4)] : Reena Score 7(3+4) [Negative] : Negative margins [2] : T2 [0] : N0 [FreeTextEntry4] : recurrent prostate ca [TTNM] : x [NTNM] : x [MTNM] : x [de-identified] : [id number] [de-identified] : [id number] [de-identified] : Prostate bed [BiopsyDate] : 9/15/2020 [TotalCores] : 13 [TotalPositiveCores] : 5 [MaxCoreInvolvement] : 75 [RadicalProstatectomyDate] : 11/19/2020 [PositiveNodes] : 0 [TotalNumberofnodesresected] : 4

## 2021-08-29 NOTE — DISEASE MANAGEMENT
[IIB] : IIB [] : Patient had a Prostate MRI [4] : 4 [Patient had a radical prostatectomy] : Patient had a radical prostatectomy  [7(3+4)] : Reena Score 7(3+4) [Negative] : Negative margins [2] : T2 [0] : N0 [Pathological] : TNM Stage: p [TTNM] : x [FreeTextEntry4] : recurrent prostate ca [NTNM] : x [MTNM] : x [de-identified] : 209 [de-identified] : [id number] [de-identified] : Prostate bed [BiopsyDate] : 9/15/2020 [TotalCores] : 13 [TotalPositiveCores] : 5 [MaxCoreInvolvement] : 75 [RadicalProstatectomyDate] : 11/19/2020 [TotalNumberofnodesresected] : 4 [PositiveNodes] : 0

## 2021-08-29 NOTE — HISTORY OF PRESENT ILLNESS
[FreeTextEntry1] : 08/18/21- OTV-  Mr. Armand Love has completed 540 cGY/ 7020 cGY of RT to the Prostate Bed. Today he presents for OTV and states he feels well denies any bleeding, incontinence, urinary burning, fatigue.  We once again reviewed treatment of his prostate malignancy, as well as potential side effects including cystitis requiring cauterization/hyperbarics, stricture, and proctitis, rectal ulceration/fistula, and erectile decline compounded by receiving RT.  He was very grateful for the review of these and will continue RT as planned.  \par \par Mr. Armand Love is a 70 year old male referred by Dr. Lau for consideration of post- prostatectomy radiation therapy for pT2N0 adenocarcinoma of the prostate. He is s/p robot-assisted radical prostatectomy with pelvic lymph node dissection on 11/19/2020, which revealed Reena score 7 (3+4), 0/4 nodes, negative margins. His post-op PSA's have ranged from 0.23- 0.26 ng/mL. \par \par He established care with Dr. Montiel on 10/17/17, and as per Dr. Montiel's note, he had a history of Reena 6 prostate cancer with last biopsy August 2016 and undergoing Active Surveillance. \par \par PSA was 4.96 ng/mL on 10/17/17. TRUS guided prostate biopsy done 3/13/18 showed the following pathology: \par 1. Right prostate: Focal high grade PIN.\par 2. Left prostate: Two small foci suspicious for prostatic adenocarcinoma however outpouchings of high grade PIN\par cannot be entirely ruled out. PIN4 immunohistochemistry shows an absence of basal cells in the suspicious foci (absence of staining with K903 and p63) and positivity with p504s supporting this diagnosis.\par \par Prostate MRI done 10/30/17 showed the following: \par -Prostate size 5 x 4.4 x 2.9 cm, with volume of 33.2 mL. \par -With respect to peripheral zone PI-RADS 1. \par -With respect to the central gland/ transition zone PI-RADS 2. \par \par PSA trend:\par 9/13/18- 6.95 ng/mL\par 3/12/19- 6.75 ng/mL\par \par Prostate MRI done 4/1/19 showed the following: \par -Prostate measures 4 x 5.1 x 4.2 cm, with volume of 43 mL. \par -PI-RADS 2. \par \par PSA trend: \par 1/7/2020- 9.79 ng/mL\par 7/9/2020- 10.4 ng/mL\par \par Prostate MRI done 7/30/2020 revealed the following: \par -Prostate size 4.5 x 3.8 x 5.3 cm. \par -Subcentimeter lesion in the lateral/posterolateral right peripheral zone at the base. PI-RADS 4. \par -No extracapsular extension, seminal vesicle invasion, pelvic lymphadenopathy, or bony metastases. \par \par Fusion biopsy done 9/15/2020 showed 5 of 13 cores positive for disease:\par 1. Left anterior apex: Benign. \par 2. Left anterior base: Benign. \par 3. Right anterior apex: Benign. \par 4. Right anterior base: Prostatic adenocarcinoma, [place] 3+3, GG1, involving less than 5% of the tissue. High-grade PIN present.\par 5., Midline apex: Benign. \par 6. Midline, base: Benign. \par 7. Left posterior apex: Benign. \par 8. Left posterior base: Benign. \par 9. Right posterior apex: Prostatic adenocarcinoma, [place] 3+3, GG1, involving 50% of the tissue.\par 10. Right posterior base: Prostatic adenocarcinoma, Reena 3+4, GG2, involving 75% of the tissue discontinuously.\par 11. Left lateral: Benign. \par 12. Right lateral: Prostatic adenocarcinoma, [place] 3+3, GG1, involving 10% of the tissue.\par 13. MRI lesion: Prostatic adenocarcinoma, [place] 3+3,  GG 1 involving discontinuously 20% and 50%  of two of three cores.\par \par On 11/19/2020, he underwent robot-assisted radical prostatectomy with pelvic lymph node dissection. Pathology showed the following: \par 1. Anterior adipose tissue: Adipose and fibrous tissue negative for tumor.\par 2. Radical prostatectomy: Prostatic adenocarcinoma Rialto's pattern 3+4, GG2.\par Extraprostatic extension: Not identified. Urinary bladder, neck, invasion: Not identified. Seminal vesicle invasion: Not identified. All margins uninvolved by invasive carcinoma.\par 3. Left pelvic lymph node: 0/2. \par 4. Right pelvic lymph node: 0/2. \par Pathologic stage classification AJCC eighth edition: pT2N0.\par \par His PSA was 0.23 ng/mL on 1/5/21. His PSA was 0.25 ng/mL on 2/10/21.\par \par PET/CT Axumin done 3/19/21 showed the following: \par -Normal fluciclovine PET scan. No evidence of recurrent or metastatic disease could be identified on this study.\par \par His PSA was 0.26 ng/mL on 4/7/21. \par \par Family history notable for mom with colon cancer.\par \par Patient is a current smoker. He lives in [place] with his wife. He is a retired .

## 2021-08-29 NOTE — HISTORY OF PRESENT ILLNESS
[FreeTextEntry1] : 08/25/21- OTV- Mr. Armand Love has completed 1620 cGY/ 7020 cGY of RT to the Prostate Bed. He presents today for follow-up and states he overall feels well. Pt c/o no appreciable symptomatology related to his definitive radiation therapy.  Specifically, He notes baseline urine function with nocturia x 2, pt does not take Flomax.  He denies dysuria, leakage or incontinence.  He denies blood in the urine.  He has no blood or mucous in the stool, and denies rectal pain. States he is having good success with Colace and Gas-X.  will continue RT as planned.\par \par 08/18/21- OTV-  Mr. Armand Love has completed 540 cGY/ 7020 cGY of RT to the Prostate Bed. Today he presents for OTV and states he feels well denies any bleeding, incontinence, urinary burning, fatigue.  We once again reviewed treatment of his prostate malignancy, as well as potential side effects including cystitis requiring cauterization/hyperbarics, stricture, and proctitis, rectal ulceration/fistula, and erectile decline compounded by receiving RT.  He was very grateful for the review of these and will continue RT as planned.  \par \par Mr. Armand Love is a 70 year old male referred by Dr. Lau for consideration of post- prostatectomy radiation therapy for pT2N0 adenocarcinoma of the prostate. He is s/p robot-assisted radical prostatectomy with pelvic lymph node dissection on 11/19/2020, which revealed [place] score 7 (3+4), 0/4 nodes, negative margins. His post-op PSA's have ranged from 0.23- 0.26 ng/mL. \par \par He established care with Dr. Montiel on 10/17/17, and as per Dr. Montiel's note, he had a history of Reena 6 prostate cancer with last biopsy August 2016 and undergoing Active Surveillance. \par \par PSA was 4.96 ng/mL on 10/17/17. TRUS guided prostate biopsy done 3/13/18 showed the following pathology: \par 1. Right prostate: Focal high grade PIN.\par 2. Left prostate: Two small foci suspicious for prostatic adenocarcinoma however outpouchings of high grade PIN\par cannot be entirely ruled out. PIN4 immunohistochemistry shows an absence of basal cells in the suspicious foci (absence of staining with K903 and p63) and positivity with p504s supporting this diagnosis.\par \par Prostate MRI done 10/30/17 showed the following: \par -Prostate size 5 x 4.4 x 2.9 cm, with volume of 33.2 mL. \par -With respect to peripheral zone PI-RADS 1. \par -With respect to the central gland/ transition zone PI-RADS 2. \par \par PSA trend:\par 9/13/18- 6.95 ng/mL\par 3/12/19- 6.75 ng/mL\par \par Prostate MRI done 4/1/19 showed the following: \par -Prostate measures 4 x 5.1 x 4.2 cm, with volume of 43 mL. \par -PI-RADS 2. \par \par PSA trend: \par 1/7/2020- 9.79 ng/mL\par 7/9/2020- 10.4 ng/mL\par \par Prostate MRI done 7/30/2020 revealed the following: \par -Prostate size 4.5 x 3.8 x 5.3 cm. \par -Subcentimeter lesion in the lateral/posterolateral right peripheral zone at the base. PI-RADS 4. \par -No extracapsular extension, seminal vesicle invasion, pelvic lymphadenopathy, or bony metastases. \par \par Fusion biopsy done 9/15/2020 showed 5 of 13 cores positive for disease:\par 1. Left anterior apex: Benign. \par 2. Left anterior base: Benign. \par 3. Right anterior apex: Benign. \par 4. Right anterior base: Prostatic adenocarcinoma, [place] 3+3, GG1, involving less than 5% of the tissue. High-grade PIN present.\par 5., Midline apex: Benign. \par 6. Midline, base: Benign. \par 7. Left posterior apex: Benign. \par 8. Left posterior base: Benign. \par 9. Right posterior apex: Prostatic adenocarcinoma, [place] 3+3, GG1, involving 50% of the tissue.\par 10. Right posterior base: Prostatic adenocarcinoma, [place] 3+4, GG2, involving 75% of the tissue discontinuously.\par 11. Left lateral: Benign. \par 12. Right lateral: Prostatic adenocarcinoma, [place] 3+3, GG1, involving 10% of the tissue.\par 13. MRI lesion: Prostatic adenocarcinoma, Reena 3+3,  GG 1 involving discontinuously 20% and 50%  of two of three cores.\par \par On 11/19/2020, he underwent robot-assisted radical prostatectomy with pelvic lymph node dissection. Pathology showed the following: \par 1. Anterior adipose tissue: Adipose and fibrous tissue negative for tumor.\par 2. Radical prostatectomy: Prostatic adenocarcinoma Reena's pattern 3+4, GG2.\par Extraprostatic extension: Not identified. Urinary bladder, neck, invasion: Not identified. Seminal vesicle invasion: Not identified. All margins uninvolved by invasive carcinoma.\par 3. Left pelvic lymph node: 0/2. \par 4. Right pelvic lymph node: 0/2. \par Pathologic stage classification AJCC eighth edition: pT2N0.\par \par His PSA was 0.23 ng/mL on 1/5/21. His PSA was 0.25 ng/mL on 2/10/21.\par \par PET/CT Axumin done 3/19/21 showed the following: \par -Normal fluciclovine PET scan. No evidence of recurrent or metastatic disease could be identified on this study.\par \par His PSA was 0.26 ng/mL on 4/7/21. \par \par Family history notable for mom with colon cancer.\par \par Patient is a current smoker. He lives in [place] with his wife. He is a retired .

## 2021-09-01 ENCOUNTER — NON-APPOINTMENT (OUTPATIENT)
Age: 71
End: 2021-09-01

## 2021-09-01 VITALS
BODY MASS INDEX: 26.34 KG/M2 | OXYGEN SATURATION: 97 % | HEIGHT: 70 IN | SYSTOLIC BLOOD PRESSURE: 151 MMHG | HEART RATE: 72 BPM | WEIGHT: 184 LBS | TEMPERATURE: 98 F | DIASTOLIC BLOOD PRESSURE: 88 MMHG

## 2021-09-10 ENCOUNTER — NON-APPOINTMENT (OUTPATIENT)
Age: 71
End: 2021-09-10

## 2021-09-13 NOTE — DISEASE MANAGEMENT
[Pathological] : TNM Stage: p [IIB] : IIB [] : Patient had a Prostate MRI [4] : 4 [Patient had a radical prostatectomy] : Patient had a radical prostatectomy  [7(3+4)] : Reena Score 7(3+4) [Negative] : Negative margins [2] : T2 [0] : N0 [FreeTextEntry4] : recurrent prostate ca [TTNM] : x [NTNM] : x [MTNM] : x [de-identified] : 1812 [de-identified] : 4526 [de-identified] : Prostate bed [BiopsyDate] : 9/15/2020 [TotalCores] : 13 [TotalPositiveCores] : 5 [MaxCoreInvolvement] : 75 [RadicalProstatectomyDate] : 11/19/2020 [TotalNumberofnodesresected] : 4 [PositiveNodes] : 0

## 2021-09-13 NOTE — HISTORY OF PRESENT ILLNESS
[FreeTextEntry1] : 09/01/21- OTV- Mr. Armand Love has completed 2520 cGY/ 7020 cGY of RT to the Prostate Bed. He presents today for follow-up and states he overall feels well. Pt c/o no appreciable symptomatology related to his definitive radiation therapy.  Specifically, He notes baseline urine function with nocturia x 2, pt does not take Flomax.  He denies dysuria, but states slight leakage with heavy lifting.  He denies blood in the urine.  He has no blood or mucous in the stool, and denies rectal pain. States he is having good success with Colace and Gas-X.  Will continue RT as planned\par \par 08/25/21- OTV- Mr. Armand Love has completed 1620 cGY/ 7020 cGY of RT to the Prostate Bed. He presents today for follow-up and states he overall feels well. Pt c/o no appreciable symptomatology related to his definitive radiation therapy.  Specifically, He notes baseline urine function with nocturia x 2, pt does not take Flomax.  He denies dysuria, leakage or incontinence.  He denies blood in the urine.  He has no blood or mucous in the stool, and denies rectal pain. States he is having good success with Colace and Gas-X.  will continue RT as planned.\par \par 08/18/21- OTV-  Mr. Armand Love has completed 540 cGY/ 7020 cGY of RT to the Prostate Bed. Today he presents for OTV and states he feels well denies any bleeding, incontinence, urinary burning, fatigue.  We once again reviewed treatment of his prostate malignancy, as well as potential side effects including cystitis requiring cauterization/hyperbarics, stricture, and proctitis, rectal ulceration/fistula, and erectile decline compounded by receiving RT.  He was very grateful for the review of these and will continue RT as planned.  \par \par Mr. Armand Love is a 70 year old male referred by Dr. Lau for consideration of post- prostatectomy radiation therapy for pT2N0 adenocarcinoma of the prostate. He is s/p robot-assisted radical prostatectomy with pelvic lymph node dissection on 11/19/2020, which revealed [place] score 7 (3+4), 0/4 nodes, negative margins. His post-op PSA's have ranged from 0.23- 0.26 ng/mL. \par \par He established care with Dr. Montiel on 10/17/17, and as per Dr. Montiel's note, he had a history of Reena 6 prostate cancer with last biopsy August 2016 and undergoing Active Surveillance. \par \par PSA was 4.96 ng/mL on 10/17/17. TRUS guided prostate biopsy done 3/13/18 showed the following pathology: \par 1. Right prostate: Focal high grade PIN.\par 2. Left prostate: Two small foci suspicious for prostatic adenocarcinoma however outpouchings of high grade PIN\par cannot be entirely ruled out. PIN4 immunohistochemistry shows an absence of basal cells in the suspicious foci (absence of staining with K903 and p63) and positivity with p504s supporting this diagnosis.\par \par Prostate MRI done 10/30/17 showed the following: \par -Prostate size 5 x 4.4 x 2.9 cm, with volume of 33.2 mL. \par -With respect to peripheral zone PI-RADS 1. \par -With respect to the central gland/ transition zone PI-RADS 2. \par \par PSA trend:\par 9/13/18- 6.95 ng/mL\par 3/12/19- 6.75 ng/mL\par \par Prostate MRI done 4/1/19 showed the following: \par -Prostate measures 4 x 5.1 x 4.2 cm, with volume of 43 mL. \par -PI-RADS 2. \par \par PSA trend: \par 1/7/2020- 9.79 ng/mL\par 7/9/2020- 10.4 ng/mL\par \par Prostate MRI done 7/30/2020 revealed the following: \par -Prostate size 4.5 x 3.8 x 5.3 cm. \par -Subcentimeter lesion in the lateral/posterolateral right peripheral zone at the base. PI-RADS 4. \par -No extracapsular extension, seminal vesicle invasion, pelvic lymphadenopathy, or bony metastases. \par \par Fusion biopsy done 9/15/2020 showed 5 of 13 cores positive for disease:\par 1. Left anterior apex: Benign. \par 2. Left anterior base: Benign. \par 3. Right anterior apex: Benign. \par 4. Right anterior base: Prostatic adenocarcinoma, [place] 3+3, GG1, involving less than 5% of the tissue. High-grade PIN present.\par 5., Midline apex: Benign. \par 6. Midline, base: Benign. \par 7. Left posterior apex: Benign. \par 8. Left posterior base: Benign. \par 9. Right posterior apex: Prostatic adenocarcinoma, Reena 3+3, GG1, involving 50% of the tissue.\par 10. Right posterior base: Prostatic adenocarcinoma, Reena 3+4, GG2, involving 75% of the tissue discontinuously.\par 11. Left lateral: Benign. \par 12. Right lateral: Prostatic adenocarcinoma, [place] 3+3, GG1, involving 10% of the tissue.\par 13. MRI lesion: Prostatic adenocarcinoma, Reena 3+3,  GG 1 involving discontinuously 20% and 50%  of two of three cores.\par \par On 11/19/2020, he underwent robot-assisted radical prostatectomy with pelvic lymph node dissection. Pathology showed the following: \par 1. Anterior adipose tissue: Adipose and fibrous tissue negative for tumor.\par 2. Radical prostatectomy: Prostatic adenocarcinoma [place]'s pattern 3+4, GG2.\par Extraprostatic extension: Not identified. Urinary bladder, neck, invasion: Not identified. Seminal vesicle invasion: Not identified. All margins uninvolved by invasive carcinoma.\par 3. Left pelvic lymph node: 0/2. \par 4. Right pelvic lymph node: 0/2. \par Pathologic stage classification AJCC eighth edition: pT2N0.\par \par His PSA was 0.23 ng/mL on 1/5/21. His PSA was 0.25 ng/mL on 2/10/21.\par \par PET/CT Axumin done 3/19/21 showed the following: \par -Normal fluciclovine PET scan. No evidence of recurrent or metastatic disease could be identified on this study.\par \par His PSA was 0.26 ng/mL on 4/7/21. \par \par Family history notable for mom with colon cancer.\par \par Patient is a current smoker. He lives in [place] with his wife. He is a retired .

## 2021-09-13 NOTE — DISEASE MANAGEMENT
[Pathological] : TNM Stage: p [IIB] : IIB [] : Patient had a Prostate MRI [4] : 4 [Patient had a radical prostatectomy] : Patient had a radical prostatectomy  [7(3+4)] : Reena Score 7(3+4) [Negative] : Negative margins [2] : T2 [0] : N0 [FreeTextEntry4] : recurrent prostate ca [TTNM] : x [NTNM] : x [MTNM] : x [de-identified] : 1183 [de-identified] : 6126 [de-identified] : Prostate bed [BiopsyDate] : 9/15/2020 [TotalCores] : 13 [TotalPositiveCores] : 5 [MaxCoreInvolvement] : 75 [RadicalProstatectomyDate] : 11/19/2020 [TotalNumberofnodesresected] : 4 [PositiveNodes] : 0

## 2021-09-13 NOTE — HISTORY OF PRESENT ILLNESS
[FreeTextEntry1] : 09/10/21- OTV- Mr. Armand Love has completed 3600 cGY/ 7020 cGY of RT to the Prostate Bed. He presents today for follow-up and states he overall feels well. Pt c/o no appreciable symptomatology related to his definitive radiation therapy.  Specifically, He notes baseline urine function with nocturia x 2, pt does not take Flomax.  He denies dysuria, but states slight leakage with heavy lifting.  He denies blood in the urine.  He has no blood or mucous in the stool, and denies rectal pain. Will continue RT as planned.\par \par 09/01/21- OTV- Mr. Armand Love has completed 2520 cGY/ 7020 cGY of RT to the Prostate Bed. He presents today for follow-up and states he overall feels well. Pt c/o no appreciable symptomatology related to his definitive radiation therapy. Specifically, He notes baseline urine function with nocturia x 2, pt does not take Flomax. He denies dysuria, but states slight leakage with heavy lifting. He denies blood in the urine. He has no blood or mucous in the stool, and denies rectal pain. States he is having good success with Colace and Gas-X. Will continue RT as planned\par \par 08/25/21- OTV- Mr. Armand Love has completed 1620 cGY/ 7020 cGY of RT to the Prostate Bed. He presents today for follow-up and states he overall feels well. Pt c/o no appreciable symptomatology related to his definitive radiation therapy.  Specifically, He notes baseline urine function with nocturia x 2, pt does not take Flomax.  He denies dysuria, leakage or incontinence.  He denies blood in the urine.  He has no blood or mucous in the stool, and denies rectal pain. States he is having good success with Colace and Gas-X.  will continue RT as planned.\par \par 08/18/21- OTV-  Mr. Armand Love has completed 540 cGY/ 7020 cGY of RT to the Prostate Bed. Today he presents for OTV and states he feels well denies any bleeding, incontinence, urinary burning, fatigue.  We once again reviewed treatment of his prostate malignancy, as well as potential side effects including cystitis requiring cauterization/hyperbarics, stricture, and proctitis, rectal ulceration/fistula, and erectile decline compounded by receiving RT.  He was very grateful for the review of these and will continue RT as planned.  \par \par Mr. Armand Love is a 70 year old male referred by Dr. Lau for consideration of post- prostatectomy radiation therapy for pT2N0 adenocarcinoma of the prostate. He is s/p robot-assisted radical prostatectomy with pelvic lymph node dissection on 11/19/2020, which revealed Reena score 7 (3+4), 0/4 nodes, negative margins. His post-op PSA's have ranged from 0.23- 0.26 ng/mL. \par \par He established care with Dr. Montiel on 10/17/17, and as per Dr. Montiel's note, he had a history of [place] 6 prostate cancer with last biopsy August 2016 and undergoing Active Surveillance. \par \par PSA was 4.96 ng/mL on 10/17/17. TRUS guided prostate biopsy done 3/13/18 showed the following pathology: \par 1. Right prostate: Focal high grade PIN.\par 2. Left prostate: Two small foci suspicious for prostatic adenocarcinoma however outpouchings of high grade PIN\par cannot be entirely ruled out. PIN4 immunohistochemistry shows an absence of basal cells in the suspicious foci (absence of staining with K903 and p63) and positivity with p504s supporting this diagnosis.\par \par Prostate MRI done 10/30/17 showed the following: \par -Prostate size 5 x 4.4 x 2.9 cm, with volume of 33.2 mL. \par -With respect to peripheral zone PI-RADS 1. \par -With respect to the central gland/ transition zone PI-RADS 2. \par \par PSA trend:\par 9/13/18- 6.95 ng/mL\par 3/12/19- 6.75 ng/mL\par \par Prostate MRI done 4/1/19 showed the following: \par -Prostate measures 4 x 5.1 x 4.2 cm, with volume of 43 mL. \par -PI-RADS 2. \par \par PSA trend: \par 1/7/2020- 9.79 ng/mL\par 7/9/2020- 10.4 ng/mL\par \par Prostate MRI done 7/30/2020 revealed the following: \par -Prostate size 4.5 x 3.8 x 5.3 cm. \par -Subcentimeter lesion in the lateral/posterolateral right peripheral zone at the base. PI-RADS 4. \par -No extracapsular extension, seminal vesicle invasion, pelvic lymphadenopathy, or bony metastases. \par \par Fusion biopsy done 9/15/2020 showed 5 of 13 cores positive for disease:\par 1. Left anterior apex: Benign. \par 2. Left anterior base: Benign. \par 3. Right anterior apex: Benign. \par 4. Right anterior base: Prostatic adenocarcinoma, Reena 3+3, GG1, involving less than 5% of the tissue. High-grade PIN present.\par 5., Midline apex: Benign. \par 6. Midline, base: Benign. \par 7. Left posterior apex: Benign. \par 8. Left posterior base: Benign. \par 9. Right posterior apex: Prostatic adenocarcinoma, [place] 3+3, GG1, involving 50% of the tissue.\par 10. Right posterior base: Prostatic adenocarcinoma, Reena 3+4, GG2, involving 75% of the tissue discontinuously.\par 11. Left lateral: Benign. \par 12. Right lateral: Prostatic adenocarcinoma, [place] 3+3, GG1, involving 10% of the tissue.\par 13. MRI lesion: Prostatic adenocarcinoma, [place] 3+3,  GG 1 involving discontinuously 20% and 50%  of two of three cores.\par \par On 11/19/2020, he underwent robot-assisted radical prostatectomy with pelvic lymph node dissection. Pathology showed the following: \par 1. Anterior adipose tissue: Adipose and fibrous tissue negative for tumor.\par 2. Radical prostatectomy: Prostatic adenocarcinoma Reena's pattern 3+4, GG2.\par Extraprostatic extension: Not identified. Urinary bladder, neck, invasion: Not identified. Seminal vesicle invasion: Not identified. All margins uninvolved by invasive carcinoma.\par 3. Left pelvic lymph node: 0/2. \par 4. Right pelvic lymph node: 0/2. \par Pathologic stage classification AJCC eighth edition: pT2N0.\par \par His PSA was 0.23 ng/mL on 1/5/21. His PSA was 0.25 ng/mL on 2/10/21.\par \par PET/CT Axumin done 3/19/21 showed the following: \par -Normal fluciclovine PET scan. No evidence of recurrent or metastatic disease could be identified on this study.\par \par His PSA was 0.26 ng/mL on 4/7/21. \par \par Family history notable for mom with colon cancer.\par \par Patient is a current smoker. He lives in [place] with his wife. He is a retired .

## 2021-09-17 ENCOUNTER — NON-APPOINTMENT (OUTPATIENT)
Age: 71
End: 2021-09-17

## 2021-09-19 NOTE — DISEASE MANAGEMENT
[Pathological] : TNM Stage: p [IIB] : IIB [] : Patient had a Prostate MRI [4] : 4 [Patient had a radical prostatectomy] : Patient had a radical prostatectomy  [7(3+4)] : Reena Score 7(3+4) [Negative] : Negative margins [2] : T2 [0] : N0 [FreeTextEntry4] : recurrent prostate ca [TTNM] : x [NTNM] : x [MTNM] : x [de-identified] : 5345 [de-identified] : 1866 [de-identified] : Prostate bed [BiopsyDate] : 9/15/2020 [TotalPositiveCores] : 5 [TotalCores] : 13 [MaxCoreInvolvement] : 75 [RadicalProstatectomyDate] : 11/19/2020 [PositiveNodes] : 0 [TotalNumberofnodesresected] : 4

## 2021-09-19 NOTE — HISTORY OF PRESENT ILLNESS
[FreeTextEntry1] : 09/17/21- OTV- Mr. Armand Love has completed 4500 cGY/ 7020 cGY of RT to the Prostate Bed. He presents today for follow-up and states he overall feels well. Pt c/o no appreciable symptomatology related to his definitive radiation therapy.  Specifically, He notes baseline urine function with nocturia x 2, pt does not take Flomax.  He denies dysuria, but states slight leakage with heavy lifting.  He denies blood in the urine.  He has no blood or mucous in the stool, and denies rectal pain. Will continue RT as planned.\par \par 09/10/21- OTV- Mr. Armand Love has completed 3600 cGY/ 7020 cGY of RT to the Prostate Bed. He presents today for follow-up and states he overall feels well. Pt c/o no appreciable symptomatology related to his definitive radiation therapy.  Specifically, He notes baseline urine function with nocturia x 2, pt does not take Flomax.  He denies dysuria, but states slight leakage with heavy lifting.  He denies blood in the urine.  He has no blood or mucous in the stool, and denies rectal pain. Will continue RT as planned.\par \par 09/01/21- OTV- Mr. Armand Love has completed 2520 cGY/ 7020 cGY of RT to the Prostate Bed. He presents today for follow-up and states he overall feels well. Pt c/o no appreciable symptomatology related to his definitive radiation therapy. Specifically, He notes baseline urine function with nocturia x 2, pt does not take Flomax. He denies dysuria, but states slight leakage with heavy lifting. He denies blood in the urine. He has no blood or mucous in the stool, and denies rectal pain. States he is having good success with Colace and Gas-X. Will continue RT as planned\par \par 08/25/21- OTV- Mr. Armand Love has completed 1620 cGY/ 7020 cGY of RT to the Prostate Bed. He presents today for follow-up and states he overall feels well. Pt c/o no appreciable symptomatology related to his definitive radiation therapy.  Specifically, He notes baseline urine function with nocturia x 2, pt does not take Flomax.  He denies dysuria, leakage or incontinence.  He denies blood in the urine.  He has no blood or mucous in the stool, and denies rectal pain. States he is having good success with Colace and Gas-X.  will continue RT as planned.\par \par 08/18/21- OTV-  Mr. Armand Love has completed 540 cGY/ 7020 cGY of RT to the Prostate Bed. Today he presents for OTV and states he feels well denies any bleeding, incontinence, urinary burning, fatigue.  We once again reviewed treatment of his prostate malignancy, as well as potential side effects including cystitis requiring cauterization/hyperbarics, stricture, and proctitis, rectal ulceration/fistula, and erectile decline compounded by receiving RT.  He was very grateful for the review of these and will continue RT as planned.  \par \par Mr. Armand Love is a 70 year old male referred by Dr. Lau for consideration of post- prostatectomy radiation therapy for pT2N0 adenocarcinoma of the prostate. He is s/p robot-assisted radical prostatectomy with pelvic lymph node dissection on 11/19/2020, which revealed Reena score 7 (3+4), 0/4 nodes, negative margins. His post-op PSA's have ranged from 0.23- 0.26 ng/mL. \par \par He established care with Dr. Montiel on 10/17/17, and as per Dr. Montiel's note, he had a history of Reena 6 prostate cancer with last biopsy August 2016 and undergoing Active Surveillance. \par \par PSA was 4.96 ng/mL on 10/17/17. TRUS guided prostate biopsy done 3/13/18 showed the following pathology: \par 1. Right prostate: Focal high grade PIN.\par 2. Left prostate: Two small foci suspicious for prostatic adenocarcinoma however outpouchings of high grade PIN\par cannot be entirely ruled out. PIN4 immunohistochemistry shows an absence of basal cells in the suspicious foci (absence of staining with K903 and p63) and positivity with p504s supporting this diagnosis.\par \par Prostate MRI done 10/30/17 showed the following: \par -Prostate size 5 x 4.4 x 2.9 cm, with volume of 33.2 mL. \par -With respect to peripheral zone PI-RADS 1. \par -With respect to the central gland/ transition zone PI-RADS 2. \par \par PSA trend:\par 9/13/18- 6.95 ng/mL\par 3/12/19- 6.75 ng/mL\par \par Prostate MRI done 4/1/19 showed the following: \par -Prostate measures 4 x 5.1 x 4.2 cm, with volume of 43 mL. \par -PI-RADS 2. \par \par PSA trend: \par 1/7/2020- 9.79 ng/mL\par 7/9/2020- 10.4 ng/mL\par \par Prostate MRI done 7/30/2020 revealed the following: \par -Prostate size 4.5 x 3.8 x 5.3 cm. \par -Subcentimeter lesion in the lateral/posterolateral right peripheral zone at the base. PI-RADS 4. \par -No extracapsular extension, seminal vesicle invasion, pelvic lymphadenopathy, or bony metastases. \par \par Fusion biopsy done 9/15/2020 showed 5 of 13 cores positive for disease:\par 1. Left anterior apex: Benign. \par 2. Left anterior base: Benign. \par 3. Right anterior apex: Benign. \par 4. Right anterior base: Prostatic adenocarcinoma, [place] 3+3, GG1, involving less than 5% of the tissue. High-grade PIN present.\par 5., Midline apex: Benign. \par 6. Midline, base: Benign. \par 7. Left posterior apex: Benign. \par 8. Left posterior base: Benign. \par 9. Right posterior apex: Prostatic adenocarcinoma, [place] 3+3, GG1, involving 50% of the tissue.\par 10. Right posterior base: Prostatic adenocarcinoma, Reena 3+4, GG2, involving 75% of the tissue discontinuously.\par 11. Left lateral: Benign. \par 12. Right lateral: Prostatic adenocarcinoma, [place] 3+3, GG1, involving 10% of the tissue.\par 13. MRI lesion: Prostatic adenocarcinoma, [place] 3+3,  GG 1 involving discontinuously 20% and 50%  of two of three cores.\par \par On 11/19/2020, he underwent robot-assisted radical prostatectomy with pelvic lymph node dissection. Pathology showed the following: \par 1. Anterior adipose tissue: Adipose and fibrous tissue negative for tumor.\par 2. Radical prostatectomy: Prostatic adenocarcinoma Reena's pattern 3+4, GG2.\par Extraprostatic extension: Not identified. Urinary bladder, neck, invasion: Not identified. Seminal vesicle invasion: Not identified. All margins uninvolved by invasive carcinoma.\par 3. Left pelvic lymph node: 0/2. \par 4. Right pelvic lymph node: 0/2. \par Pathologic stage classification AJCC eighth edition: pT2N0.\par \par His PSA was 0.23 ng/mL on 1/5/21. His PSA was 0.25 ng/mL on 2/10/21.\par \par PET/CT Axumin done 3/19/21 showed the following: \par -Normal fluciclovine PET scan. No evidence of recurrent or metastatic disease could be identified on this study.\par \par His PSA was 0.26 ng/mL on 4/7/21. \par \par Family history notable for mom with colon cancer.\par \par Patient is a current smoker. He lives in [place] with his wife. He is a retired .

## 2021-09-22 ENCOUNTER — NON-APPOINTMENT (OUTPATIENT)
Age: 71
End: 2021-09-22

## 2021-09-29 ENCOUNTER — NON-APPOINTMENT (OUTPATIENT)
Age: 71
End: 2021-09-29

## 2021-10-03 NOTE — DISEASE MANAGEMENT
[Pathological] : TNM Stage: p [IIB] : IIB [] : Patient had a Prostate MRI [4] : 4 [Patient had a radical prostatectomy] : Patient had a radical prostatectomy  [7(3+4)] : Reena Score 7(3+4) [Negative] : Negative margins [2] : T2 [0] : N0 [FreeTextEntry4] : recurrent prostate ca [TTNM] : x [NTNM] : x [MTNM] : x [de-identified] : 0836 [de-identified] : [id number] [de-identified] : Prostate bed [BiopsyDate] : 9/15/2020 [TotalCores] : 13 [TotalPositiveCores] : 5 [MaxCoreInvolvement] : 75 [RadicalProstatectomyDate] : 11/19/2020 [TotalNumberofnodesresected] : 4 [PositiveNodes] : 0

## 2021-10-03 NOTE — HISTORY OF PRESENT ILLNESS
[FreeTextEntry1] : 09/22/21- OTV- Mr. Armand Love has completed 5040 cGY/ 7020 cGY of RT to the Prostate Bed. He presents today for follow-up and states he overall feels well. Pt c/o no appreciable symptomatology related to his definitive radiation therapy.  Specifically, He notes baseline urine function with nocturia x 3-4, but states he is drinking more water at night, pt does not take Flomax.  He denies dysuria, but states slight leakage with heavy lifting.  He denies blood in the urine.  He has no blood or mucous in the stool, and denies rectal pain. Will continue RT as planned.\par \par 09/17/21- OTV- Mr. Armand Love has completed 4500 cGY/ 7020 cGY of RT to the Prostate Bed. He presents today for follow-up and states he overall feels well. Pt c/o no appreciable symptomatology related to his definitive radiation therapy.  Specifically, He notes baseline urine function with nocturia x 2, pt does not take Flomax.  He denies dysuria, but states slight leakage with heavy lifting.  He denies blood in the urine.  He has no blood or mucous in the stool, and denies rectal pain. Will continue RT as planned.\par \par 09/10/21- OTV- Mr. Armand Love has completed 3600 cGY/ 7020 cGY of RT to the Prostate Bed. He presents today for follow-up and states he overall feels well. Pt c/o no appreciable symptomatology related to his definitive radiation therapy.  Specifically, He notes baseline urine function with nocturia x 2, pt does not take Flomax.  He denies dysuria, but states slight leakage with heavy lifting.  He denies blood in the urine.  He has no blood or mucous in the stool, and denies rectal pain. Will continue RT as planned.\par \par 09/01/21- OTV- Mr. Armand Love has completed 2520 cGY/ 7020 cGY of RT to the Prostate Bed. He presents today for follow-up and states he overall feels well. Pt c/o no appreciable symptomatology related to his definitive radiation therapy. Specifically, He notes baseline urine function with nocturia x 2, pt does not take Flomax. He denies dysuria, but states slight leakage with heavy lifting. He denies blood in the urine. He has no blood or mucous in the stool, and denies rectal pain. States he is having good success with Colace and Gas-X. Will continue RT as planned\par \par 08/25/21- OTV- Mr. Armand Love has completed 1620 cGY/ 7020 cGY of RT to the Prostate Bed. He presents today for follow-up and states he overall feels well. Pt c/o no appreciable symptomatology related to his definitive radiation therapy.  Specifically, He notes baseline urine function with nocturia x 2, pt does not take Flomax.  He denies dysuria, leakage or incontinence.  He denies blood in the urine.  He has no blood or mucous in the stool, and denies rectal pain. States he is having good success with Colace and Gas-X.  will continue RT as planned.\par \par 08/18/21- OTV-  Mr. Armand Love has completed 540 cGY/ 7020 cGY of RT to the Prostate Bed. Today he presents for OTV and states he feels well denies any bleeding, incontinence, urinary burning, fatigue.  We once again reviewed treatment of his prostate malignancy, as well as potential side effects including cystitis requiring cauterization/hyperbarics, stricture, and proctitis, rectal ulceration/fistula, and erectile decline compounded by receiving RT.  He was very grateful for the review of these and will continue RT as planned.  \par \par Mr. Armand Love is a 70 year old male referred by Dr. Lau for consideration of post- prostatectomy radiation therapy for pT2N0 adenocarcinoma of the prostate. He is s/p robot-assisted radical prostatectomy with pelvic lymph node dissection on 11/19/2020, which revealed Reena score 7 (3+4), 0/4 nodes, negative margins. His post-op PSA's have ranged from 0.23- 0.26 ng/mL. \par \par He established care with Dr. Montiel on 10/17/17, and as per Dr. Montiel's note, he had a history of Reena 6 prostate cancer with last biopsy August 2016 and undergoing Active Surveillance. \par \par PSA was 4.96 ng/mL on 10/17/17. TRUS guided prostate biopsy done 3/13/18 showed the following pathology: \par 1. Right prostate: Focal high grade PIN.\par 2. Left prostate: Two small foci suspicious for prostatic adenocarcinoma however outpouchings of high grade PIN\par cannot be entirely ruled out. PIN4 immunohistochemistry shows an absence of basal cells in the suspicious foci (absence of staining with K903 and p63) and positivity with p504s supporting this diagnosis.\par \par Prostate MRI done 10/30/17 showed the following: \par -Prostate size 5 x 4.4 x 2.9 cm, with volume of 33.2 mL. \par -With respect to peripheral zone PI-RADS 1. \par -With respect to the central gland/ transition zone PI-RADS 2. \par \par PSA trend:\par 9/13/18- 6.95 ng/mL\par 3/12/19- 6.75 ng/mL\par \par Prostate MRI done 4/1/19 showed the following: \par -Prostate measures 4 x 5.1 x 4.2 cm, with volume of 43 mL. \par -PI-RADS 2. \par \par PSA trend: \par 1/7/2020- 9.79 ng/mL\par 7/9/2020- 10.4 ng/mL\par \par Prostate MRI done 7/30/2020 revealed the following: \par -Prostate size 4.5 x 3.8 x 5.3 cm. \par -Subcentimeter lesion in the lateral/posterolateral right peripheral zone at the base. PI-RADS 4. \par -No extracapsular extension, seminal vesicle invasion, pelvic lymphadenopathy, or bony metastases. \par \par Fusion biopsy done 9/15/2020 showed 5 of 13 cores positive for disease:\par 1. Left anterior apex: Benign. \par 2. Left anterior base: Benign. \par 3. Right anterior apex: Benign. \par 4. Right anterior base: Prostatic adenocarcinoma, [place] 3+3, GG1, involving less than 5% of the tissue. High-grade PIN present.\par 5., Midline apex: Benign. \par 6. Midline, base: Benign. \par 7. Left posterior apex: Benign. \par 8. Left posterior base: Benign. \par 9. Right posterior apex: Prostatic adenocarcinoma, Reena 3+3, GG1, involving 50% of the tissue.\par 10. Right posterior base: Prostatic adenocarcinoma, Reena 3+4, GG2, involving 75% of the tissue discontinuously.\par 11. Left lateral: Benign. \par 12. Right lateral: Prostatic adenocarcinoma, [place] 3+3, GG1, involving 10% of the tissue.\par 13. MRI lesion: Prostatic adenocarcinoma, [place] 3+3,  GG 1 involving discontinuously 20% and 50%  of two of three cores.\par \par On 11/19/2020, he underwent robot-assisted radical prostatectomy with pelvic lymph node dissection. Pathology showed the following: \par 1. Anterior adipose tissue: Adipose and fibrous tissue negative for tumor.\par 2. Radical prostatectomy: Prostatic adenocarcinoma Reena's pattern 3+4, GG2.\par Extraprostatic extension: Not identified. Urinary bladder, neck, invasion: Not identified. Seminal vesicle invasion: Not identified. All margins uninvolved by invasive carcinoma.\par 3. Left pelvic lymph node: 0/2. \par 4. Right pelvic lymph node: 0/2. \par Pathologic stage classification AJCC eighth edition: pT2N0.\par \par His PSA was 0.23 ng/mL on 1/5/21. His PSA was 0.25 ng/mL on 2/10/21.\par \par PET/CT Axumin done 3/19/21 showed the following: \par -Normal fluciclovine PET scan. No evidence of recurrent or metastatic disease could be identified on this study.\par \par His PSA was 0.26 ng/mL on 4/7/21. \par \par Family history notable for mom with colon cancer.\par \par Patient is a current smoker. He lives in [place] with his wife. He is a retired .

## 2021-10-03 NOTE — HISTORY OF PRESENT ILLNESS
[FreeTextEntry1] : 09/29/21- OTV- Mr. Armand Love has completed 5940 cGY/ 7020 cGY of RT to the Prostate Bed. He presents today for follow-up and states he overall feels well. Pt c/o no appreciable symptomatology related to his definitive radiation therapy.  Specifically, He notes baseline urine function with nocturia x 2-3, but states he is drinking less  water now at night, pt does not take Flomax.  He denies dysuria, but states slight leakage at times with heavy lifting.  He denies blood in the urine.  He has no blood or mucous in the stool, and denies rectal pain. Will continue RT as planned.\par \par 09/22/21- OTV- Mr. Armand Love has completed 5040 cGY/ 7020 cGY of RT to the Prostate Bed. He presents today for follow-up and states he overall feels well. Pt c/o no appreciable symptomatology related to his definitive radiation therapy.  Specifically, He notes baseline urine function with nocturia x 3-4, but states he is drinking more water at night, pt does not take Flomax.  He denies dysuria, but states slight leakage with heavy lifting.  He denies blood in the urine.  He has no blood or mucous in the stool, and denies rectal pain. Will continue RT as planned.\par \par 09/17/21- OTV- Mr. Armand Love has completed 4500 cGY/ 7020 cGY of RT to the Prostate Bed. He presents today for follow-up and states he overall feels well. Pt c/o no appreciable symptomatology related to his definitive radiation therapy.  Specifically, He notes baseline urine function with nocturia x 2, pt does not take Flomax.  He denies dysuria, but states slight leakage with heavy lifting.  He denies blood in the urine.  He has no blood or mucous in the stool, and denies rectal pain. Will continue RT as planned.\par \par 09/10/21- OTV- Mr. Armand Love has completed 3600 cGY/ 7020 cGY of RT to the Prostate Bed. He presents today for follow-up and states he overall feels well. Pt c/o no appreciable symptomatology related to his definitive radiation therapy.  Specifically, He notes baseline urine function with nocturia x 2, pt does not take Flomax.  He denies dysuria, but states slight leakage with heavy lifting.  He denies blood in the urine.  He has no blood or mucous in the stool, and denies rectal pain. Will continue RT as planned.\par \par 09/01/21- OTV- Mr. Armand Love has completed 2520 cGY/ 7020 cGY of RT to the Prostate Bed. He presents today for follow-up and states he overall feels well. Pt c/o no appreciable symptomatology related to his definitive radiation therapy. Specifically, He notes baseline urine function with nocturia x 2, pt does not take Flomax. He denies dysuria, but states slight leakage with heavy lifting. He denies blood in the urine. He has no blood or mucous in the stool, and denies rectal pain. States he is having good success with Colace and Gas-X. Will continue RT as planned\par \par 08/25/21- OTV- Mr. Armand Love has completed 1620 cGY/ 7020 cGY of RT to the Prostate Bed. He presents today for follow-up and states he overall feels well. Pt c/o no appreciable symptomatology related to his definitive radiation therapy.  Specifically, He notes baseline urine function with nocturia x 2, pt does not take Flomax.  He denies dysuria, leakage or incontinence.  He denies blood in the urine.  He has no blood or mucous in the stool, and denies rectal pain. States he is having good success with Colace and Gas-X.  will continue RT as planned.\par \par 08/18/21- OTV-  Mr. Armand Love has completed 540 cGY/ 7020 cGY of RT to the Prostate Bed. Today he presents for OTV and states he feels well denies any bleeding, incontinence, urinary burning, fatigue.  We once again reviewed treatment of his prostate malignancy, as well as potential side effects including cystitis requiring cauterization/hyperbarics, stricture, and proctitis, rectal ulceration/fistula, and erectile decline compounded by receiving RT.  He was very grateful for the review of these and will continue RT as planned.  \par \par Mr. Armand Love is a 70 year old male referred by Dr. Lau for consideration of post- prostatectomy radiation therapy for pT2N0 adenocarcinoma of the prostate. He is s/p robot-assisted radical prostatectomy with pelvic lymph node dissection on 11/19/2020, which revealed [place] score 7 (3+4), 0/4 nodes, negative margins. His post-op PSA's have ranged from 0.23- 0.26 ng/mL. \par \par He established care with Dr. Montiel on 10/17/17, and as per Dr. Montiel's note, he had a history of Reena 6 prostate cancer with last biopsy August 2016 and undergoing Active Surveillance. \par \par PSA was 4.96 ng/mL on 10/17/17. TRUS guided prostate biopsy done 3/13/18 showed the following pathology: \par 1. Right prostate: Focal high grade PIN.\par 2. Left prostate: Two small foci suspicious for prostatic adenocarcinoma however outpouchings of high grade PIN\par cannot be entirely ruled out. PIN4 immunohistochemistry shows an absence of basal cells in the suspicious foci (absence of staining with K903 and p63) and positivity with p504s supporting this diagnosis.\par \par Prostate MRI done 10/30/17 showed the following: \par -Prostate size 5 x 4.4 x 2.9 cm, with volume of 33.2 mL. \par -With respect to peripheral zone PI-RADS 1. \par -With respect to the central gland/ transition zone PI-RADS 2. \par \par PSA trend:\par 9/13/18- 6.95 ng/mL\par 3/12/19- 6.75 ng/mL\par \par Prostate MRI done 4/1/19 showed the following: \par -Prostate measures 4 x 5.1 x 4.2 cm, with volume of 43 mL. \par -PI-RADS 2. \par \par PSA trend: \par 1/7/2020- 9.79 ng/mL\par 7/9/2020- 10.4 ng/mL\par \par Prostate MRI done 7/30/2020 revealed the following: \par -Prostate size 4.5 x 3.8 x 5.3 cm. \par -Subcentimeter lesion in the lateral/posterolateral right peripheral zone at the base. PI-RADS 4. \par -No extracapsular extension, seminal vesicle invasion, pelvic lymphadenopathy, or bony metastases. \par \par Fusion biopsy done 9/15/2020 showed 5 of 13 cores positive for disease:\par 1. Left anterior apex: Benign. \par 2. Left anterior base: Benign. \par 3. Right anterior apex: Benign. \par 4. Right anterior base: Prostatic adenocarcinoma, Reena 3+3, GG1, involving less than 5% of the tissue. High-grade PIN present.\par 5., Midline apex: Benign. \par 6. Midline, base: Benign. \par 7. Left posterior apex: Benign. \par 8. Left posterior base: Benign. \par 9. Right posterior apex: Prostatic adenocarcinoma, Reena 3+3, GG1, involving 50% of the tissue.\par 10. Right posterior base: Prostatic adenocarcinoma, [place] 3+4, GG2, involving 75% of the tissue discontinuously.\par 11. Left lateral: Benign. \par 12. Right lateral: Prostatic adenocarcinoma, Reena 3+3, GG1, involving 10% of the tissue.\par 13. MRI lesion: Prostatic adenocarcinoma, Reena 3+3,  GG 1 involving discontinuously 20% and 50%  of two of three cores.\par \par On 11/19/2020, he underwent robot-assisted radical prostatectomy with pelvic lymph node dissection. Pathology showed the following: \par 1. Anterior adipose tissue: Adipose and fibrous tissue negative for tumor.\par 2. Radical prostatectomy: Prostatic adenocarcinoma [place]'s pattern 3+4, GG2.\par Extraprostatic extension: Not identified. Urinary bladder, neck, invasion: Not identified. Seminal vesicle invasion: Not identified. All margins uninvolved by invasive carcinoma.\par 3. Left pelvic lymph node: 0/2. \par 4. Right pelvic lymph node: 0/2. \par Pathologic stage classification AJCC eighth edition: pT2N0.\par \par His PSA was 0.23 ng/mL on 1/5/21. His PSA was 0.25 ng/mL on 2/10/21.\par \par PET/CT Axumin done 3/19/21 showed the following: \par -Normal fluciclovine PET scan. No evidence of recurrent or metastatic disease could be identified on this study.\par \par His PSA was 0.26 ng/mL on 4/7/21. \par \par Family history notable for mom with colon cancer.\par \par Patient is a current smoker. He lives in [place] with his wife. He is a retired .

## 2021-10-03 NOTE — DISEASE MANAGEMENT
[Pathological] : TNM Stage: p [IIB] : IIB [] : Patient had a Prostate MRI [4] : 4 [Patient had a radical prostatectomy] : Patient had a radical prostatectomy  [7(3+4)] : Reena Score 7(3+4) [2] : T2 [Negative] : Negative margins [0] : N0 [TTNM] : x [FreeTextEntry4] : recurrent prostate ca [NTNM] : x [MTNM] : x [de-identified] : 7461 [de-identified] : 6721 [de-identified] : Prostate bed [BiopsyDate] : 9/15/2020 [TotalCores] : 13 [TotalPositiveCores] : 5 [MaxCoreInvolvement] : 75 [RadicalProstatectomyDate] : 11/19/2020 [TotalNumberofnodesresected] : 4 [PositiveNodes] : 0

## 2021-10-06 ENCOUNTER — NON-APPOINTMENT (OUTPATIENT)
Age: 71
End: 2021-10-06

## 2021-10-18 ENCOUNTER — APPOINTMENT (OUTPATIENT)
Dept: UROLOGY | Facility: CLINIC | Age: 71
End: 2021-10-18
Payer: MEDICARE

## 2021-10-18 VITALS — HEART RATE: 78 BPM | TEMPERATURE: 97.9 F | DIASTOLIC BLOOD PRESSURE: 86 MMHG | SYSTOLIC BLOOD PRESSURE: 133 MMHG

## 2021-10-18 PROCEDURE — 99213 OFFICE O/P EST LOW 20 MIN: CPT

## 2021-10-18 NOTE — ASSESSMENT
[FreeTextEntry1] : 69yo male s/p RALP, PLND 11/19/20\par Final path pT2N0, GG2, negative margins\par 6 week postop PSA = 0.23, 3 month postop PSA = 0.25\par Completed salvage XRT 10/2021 + ADT 6 months\par F/u Rad Onc\par F/u 3 months

## 2021-10-18 NOTE — REVIEW OF SYSTEMS
[Feeling Tired] : feeling tired [see HPI] : see HPI [Hot Flashes] : hot flashes [Erectile Dysfunction] : erectile dysfunction [Negative] : Heme/Lymph

## 2021-10-18 NOTE — HISTORY OF PRESENT ILLNESS
[FreeTextEntry1] : 68yo male referred by Dr. Montiel for evaluation for MRI fusion biopsy. 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 PSA has been trending up, now 10. Had MRI which shows 47gm prostate, 6mm PI-RADS 4 lesion. \par \par 9/30/20 Here for f/u. Had MRI fusion biopsy. Biopsy shows 5/13 cores positive, [place] 3+4 disease, up to 70% core involved. No complications from biopsy. \par \par 10/14/20 Here for f/u. Patient is interested in proceeding with surgery, here for surgery discussion. \par \par 11/30/20 Here for postop visit. Underwent RALP, PNLD 11/19. Doing well. Final path: pT2N0, Gl 3+4, negative margins. \par \par 1/11/21 Here for postop visit. Had PSA last week = 0.23. 1 pad/day. Started Viagra last week had moderate response. \par \par 2/10/21 Here for f/u. 6 week postop PSA was detectable, returns to repeat PSA today. No erections yet. Experiencing mild incontinence, wears 1 pad per day. \par \par 4/07/21 Here for f/u. 3 month postop PSA was detectable, 0.25, not really consistent with his path pT2N0, 3+4 with negative margins. PET Axumin was performed which is negative. Wearing 1 pad/day. +ED. \par \par 4/21/21 Here to discuss ADT. Wearing 1 pad/day. Plan for salvage radiation when incontinence improves. \par \par 6/2/21: Here for f/u after 1st dose of ADT (4/21/21). Pt c/o hot flashes. Reports good energy levels. Still walking 2-3 miles. Urinary incontinence has improved. No longer needs pads. Per patient, before end of June he will start radiation treatment. \par \par 7/14/21 Here for f/u for 2nd Lupron injection. Doing well. No pads. Using Viagra prn. Has not started radiation yet\par \par 10/18/21 Here for f/u. Completed radiation 2 weeks ago. Feeling fatigue, +hot flashes, otherwise doing OK. Taking Viagra prn.  [None] : None

## 2021-10-20 NOTE — DISEASE MANAGEMENT
[Pathological] : TNM Stage: p [IIB] : IIB [] : Patient had a Prostate MRI [4] : 4 [Patient had a radical prostatectomy] : Patient had a radical prostatectomy  [7(3+4)] : Reena Score 7(3+4) [Negative] : Negative margins [2] : T2 [0] : N0 [FreeTextEntry4] : recurrent prostate ca [TTNM] : x [NTNM] : x [MTNM] : x [de-identified] : 2545 [de-identified] : 5266 [de-identified] : Prostate bed [BiopsyDate] : 9/15/2020 [TotalCores] : 13 [TotalPositiveCores] : 5 [MaxCoreInvolvement] : 75 [TotalNumberofnodesresected] : 4 [RadicalProstatectomyDate] : 11/19/2020 [PositiveNodes] : 0

## 2021-10-20 NOTE — HISTORY OF PRESENT ILLNESS
[FreeTextEntry1] : 10/06/21- OTV- Mr. Armand Love has completed 6840 cGY/ 7020 cGY of RT to the Prostate Bed. He presents today for follow-up and states he overall feels well. Pt c/o no appreciable symptomatology related to his definitive radiation therapy.  Specifically, He notes baseline urine function with nocturia x 2-3, but states he is drinking less water now at night, pt does not take Flomax.  He c/o some dysuria, that he has to push harder to urinate at times  as well as a narrower stream than normal, pt recommended to start taking advil at night occasionally on a full stomach. Pt recommended to continue follow up with Dr. Lau. He states slight leakage at times with heavy lifting.  He denies blood in the urine.  He has no blood or mucous in the stool, and denies rectal pain. Will continue RT as planned.\par \par 09/29/21- OTV- Mr. Armand Love has completed 5940 cGY/ 7020 cGY of RT to the Prostate Bed. He presents today for follow-up and states he overall feels well. Pt c/o no appreciable symptomatology related to his definitive radiation therapy.  Specifically, He notes baseline urine function with nocturia x 2-3, but states he is drinking less  water now at night, pt does not take Flomax.  He denies dysuria, but states slight leakage at times with heavy lifting.  He denies blood in the urine.  He has no blood or mucous in the stool, and denies rectal pain. Will continue RT as planned.\par \par 09/22/21- OTV- Mr. Armand Love has completed 5040 cGY/ 7020 cGY of RT to the Prostate Bed. He presents today for follow-up and states he overall feels well. Pt c/o no appreciable symptomatology related to his definitive radiation therapy.  Specifically, He notes baseline urine function with nocturia x 3-4, but states he is drinking more water at night, pt does not take Flomax.  He denies dysuria, but states slight leakage with heavy lifting.  He denies blood in the urine.  He has no blood or mucous in the stool, and denies rectal pain. Will continue RT as planned.\par \par 09/17/21- OTV- Mr. Armand Love has completed 4500 cGY/ 7020 cGY of RT to the Prostate Bed. He presents today for follow-up and states he overall feels well. Pt c/o no appreciable symptomatology related to his definitive radiation therapy.  Specifically, He notes baseline urine function with nocturia x 2, pt does not take Flomax.  He denies dysuria, but states slight leakage with heavy lifting.  He denies blood in the urine.  He has no blood or mucous in the stool, and denies rectal pain. Will continue RT as planned.\par \par 09/10/21- OTV- Mr. Armand Love has completed 3600 cGY/ 7020 cGY of RT to the Prostate Bed. He presents today for follow-up and states he overall feels well. Pt c/o no appreciable symptomatology related to his definitive radiation therapy.  Specifically, He notes baseline urine function with nocturia x 2, pt does not take Flomax.  He denies dysuria, but states slight leakage with heavy lifting.  He denies blood in the urine.  He has no blood or mucous in the stool, and denies rectal pain. Will continue RT as planned.\par \par 09/01/21- OTV- Mr. Armand Love has completed 2520 cGY/ 7020 cGY of RT to the Prostate Bed. He presents today for follow-up and states he overall feels well. Pt c/o no appreciable symptomatology related to his definitive radiation therapy. Specifically, He notes baseline urine function with nocturia x 2, pt does not take Flomax. He denies dysuria, but states slight leakage with heavy lifting. He denies blood in the urine. He has no blood or mucous in the stool, and denies rectal pain. States he is having good success with Colace and Gas-X. Will continue RT as planned\par \par 08/25/21- OTV- Mr. Armand Love has completed 1620 cGY/ 7020 cGY of RT to the Prostate Bed. He presents today for follow-up and states he overall feels well. Pt c/o no appreciable symptomatology related to his definitive radiation therapy.  Specifically, He notes baseline urine function with nocturia x 2, pt does not take Flomax.  He denies dysuria, leakage or incontinence.  He denies blood in the urine.  He has no blood or mucous in the stool, and denies rectal pain. States he is having good success with Colace and Gas-X.  will continue RT as planned.\par \par 08/18/21- OTV-  Mr. Armand Love has completed 540 cGY/ 7020 cGY of RT to the Prostate Bed. Today he presents for OTV and states he feels well denies any bleeding, incontinence, urinary burning, fatigue.  We once again reviewed treatment of his prostate malignancy, as well as potential side effects including cystitis requiring cauterization/hyperbarics, stricture, and proctitis, rectal ulceration/fistula, and erectile decline compounded by receiving RT.  He was very grateful for the review of these and will continue RT as planned.  \par \par Mr. Armand Love is a 70 year old male referred by Dr. Lau for consideration of post- prostatectomy radiation therapy for pT2N0 adenocarcinoma of the prostate. He is s/p robot-assisted radical prostatectomy with pelvic lymph node dissection on 11/19/2020, which revealed Reena score 7 (3+4), 0/4 nodes, negative margins. His post-op PSA's have ranged from 0.23- 0.26 ng/mL. \par \par He established care with Dr. Montiel on 10/17/17, and as per Dr. Montiel's note, he had a history of Reena 6 prostate cancer with last biopsy August 2016 and undergoing Active Surveillance. \par \par PSA was 4.96 ng/mL on 10/17/17. TRUS guided prostate biopsy done 3/13/18 showed the following pathology: \par 1. Right prostate: Focal high grade PIN.\par 2. Left prostate: Two small foci suspicious for prostatic adenocarcinoma however outpouchings of high grade PIN\par cannot be entirely ruled out. PIN4 immunohistochemistry shows an absence of basal cells in the suspicious foci (absence of staining with K903 and p63) and positivity with p504s supporting this diagnosis.\par \par Prostate MRI done 10/30/17 showed the following: \par -Prostate size 5 x 4.4 x 2.9 cm, with volume of 33.2 mL. \par -With respect to peripheral zone PI-RADS 1. \par -With respect to the central gland/ transition zone PI-RADS 2. \par \par PSA trend:\par 9/13/18- 6.95 ng/mL\par 3/12/19- 6.75 ng/mL\par \par Prostate MRI done 4/1/19 showed the following: \par -Prostate measures 4 x 5.1 x 4.2 cm, with volume of 43 mL. \par -PI-RADS 2. \par \par PSA trend: \par 1/7/2020- 9.79 ng/mL\par 7/9/2020- 10.4 ng/mL\par \par Prostate MRI done 7/30/2020 revealed the following: \par -Prostate size 4.5 x 3.8 x 5.3 cm. \par -Subcentimeter lesion in the lateral/posterolateral right peripheral zone at the base. PI-RADS 4. \par -No extracapsular extension, seminal vesicle invasion, pelvic lymphadenopathy, or bony metastases. \par \par Fusion biopsy done 9/15/2020 showed 5 of 13 cores positive for disease:\par 1. Left anterior apex: Benign. \par 2. Left anterior base: Benign. \par 3. Right anterior apex: Benign. \par 4. Right anterior base: Prostatic adenocarcinoma, Reena 3+3, GG1, involving less than 5% of the tissue. High-grade PIN present.\par 5., Midline apex: Benign. \par 6. Midline, base: Benign. \par 7. Left posterior apex: Benign. \par 8. Left posterior base: Benign. \par 9. Right posterior apex: Prostatic adenocarcinoma, [place] 3+3, GG1, involving 50% of the tissue.\par 10. Right posterior base: Prostatic adenocarcinoma, Reena 3+4, GG2, involving 75% of the tissue discontinuously.\par 11. Left lateral: Benign. \par 12. Right lateral: Prostatic adenocarcinoma, [place] 3+3, GG1, involving 10% of the tissue.\par 13. MRI lesion: Prostatic adenocarcinoma, [place] 3+3,  GG 1 involving discontinuously 20% and 50%  of two of three cores.\par \par On 11/19/2020, he underwent robot-assisted radical prostatectomy with pelvic lymph node dissection. Pathology showed the following: \par 1. Anterior adipose tissue: Adipose and fibrous tissue negative for tumor.\par 2. Radical prostatectomy: Prostatic adenocarcinoma Reena's pattern 3+4, GG2.\par Extraprostatic extension: Not identified. Urinary bladder, neck, invasion: Not identified. Seminal vesicle invasion: Not identified. All margins uninvolved by invasive carcinoma.\par 3. Left pelvic lymph node: 0/2. \par 4. Right pelvic lymph node: 0/2. \par Pathologic stage classification AJCC eighth edition: pT2N0.\par \par His PSA was 0.23 ng/mL on 1/5/21. His PSA was 0.25 ng/mL on 2/10/21.\par \par PET/CT Axumin done 3/19/21 showed the following: \par -Normal fluciclovine PET scan. No evidence of recurrent or metastatic disease could be identified on this study.\par \par His PSA was 0.26 ng/mL on 4/7/21. \par \par Family history notable for mom with colon cancer.\par \par Patient is a current smoker. He lives in [place] with his wife. He is a retired .

## 2021-11-17 ENCOUNTER — APPOINTMENT (OUTPATIENT)
Dept: RADIATION ONCOLOGY | Facility: CLINIC | Age: 71
End: 2021-11-17
Payer: MEDICARE

## 2021-11-17 VITALS
TEMPERATURE: 98.3 F | HEIGHT: 70 IN | SYSTOLIC BLOOD PRESSURE: 149 MMHG | DIASTOLIC BLOOD PRESSURE: 80 MMHG | OXYGEN SATURATION: 99 % | HEART RATE: 86 BPM | WEIGHT: 192 LBS | BODY MASS INDEX: 27.49 KG/M2

## 2021-11-17 PROCEDURE — 99024 POSTOP FOLLOW-UP VISIT: CPT

## 2021-11-17 NOTE — REVIEW OF SYSTEMS
[Nocturia] : nocturia [IPSS Score (0-40): ___] : IPSS score: [unfilled] [EPIC-CP Score (0-60): ___] : EPIC-CP score: [unfilled] [Negative] : Psychiatric

## 2021-11-17 NOTE — DISEASE MANAGEMENT
[] : Patient had a Prostate MRI [4] : 4 [IIB] : IIB [Patient had a radical prostatectomy] : Patient had a radical prostatectomy  [7(3+4)] : Reena Score 7(3+4) [Negative] : Negative margins [2] : T2 [0] : N0 [Pathological] : TNM Stage: p [TotalCores] : 13 [TotalPositiveCores] : 5 [MaxCoreInvolvement] : 75 [RadicalProstatectomyDate] : 11/19/2020 [TotalNumberofnodesresected] : 4 [PositiveNodes] : 0 [FreeTextEntry4] : recurrent prostate ca [TTNM] : x [NTNM] : x [MTNM] : x [de-identified] : 8573 [de-identified] : 3800 [de-identified] : Prostate bed

## 2021-11-17 NOTE — HISTORY OF PRESENT ILLNESS
[FreeTextEntry1] : Mr. Armand Love is a 70 year old male diagnosed with pT2N0 adenocarcinoma of the prostate. He is s/p robot-assisted radical prostatectomy with pelvic lymph node dissection on 11/19/2020, which revealed [place] score 7 (3+4), 0/4 nodes, negative margins. His post-op PSA's have ranged from 0.23- 0.26 ng/mL. He received 2nd Lupron on 7/14/21 and completed 7020 cGY of RT to the Prostate Bed on 10/7/21.\par \par 11/17/2021- PTE- He presents today for follow-up and states he overall feels well. Pt c/o no appreciable symptomatology related to his definitive radiation therapy.  Specifically, he notes baseline urine function with nocturia x1-2, while not using Flomax 0.4mg at night.  He denies dysuria, but c/o leakage or incontinence with heavy lifting and coughing which is baseline. He has good urine flow and denies urinary frequency or urgency. He denies blood in the urine.  He has no blood or mucous in the stool, and denies rectal pain.  He denied ADT therapy in the past. He is able to have erections and uses Viagra with good result. He last met with his Urologist, Dr. Lau on 10/18/21, with a benign physical examination. He received 2nd and last Lupron on 7/14/21 and continues to c/o hot flashes.\par \par \par ONC Hx:\par -------------\par He established care with Dr. Montiel on 10/17/17, and as per Dr. Montiel's note, he had a history of [place] 6 prostate cancer with last biopsy August 2016 and undergoing Active Surveillance. \par \par PSA was 4.96 ng/mL on 10/17/17. TRUS guided prostate biopsy done 3/13/18 showed the following pathology: \par 1. Right prostate: Focal high grade PIN.\par 2. Left prostate: Two small foci suspicious for prostatic adenocarcinoma however outpouchings of high grade PIN\par cannot be entirely ruled out. PIN4 immunohistochemistry shows an absence of basal cells in the suspicious foci (absence of staining with K903 and p63) and positivity with p504s supporting this diagnosis.\par \par Prostate MRI done 10/30/17 showed the following: \par -Prostate size 5 x 4.4 x 2.9 cm, with volume of 33.2 mL. \par -With respect to peripheral zone PI-RADS 1. \par -With respect to the central gland/ transition zone PI-RADS 2. \par \par PSA trend:\par 9/13/18- 6.95 ng/mL\par 3/12/19- 6.75 ng/mL\par \par Prostate MRI done 4/1/19 showed the following: \par -Prostate measures 4 x 5.1 x 4.2 cm, with volume of 43 mL. \par -PI-RADS 2. \par \par PSA trend: \par 1/7/2020- 9.79 ng/mL\par 7/9/2020- 10.4 ng/mL\par \par Prostate MRI done 7/30/2020 revealed the following: \par -Prostate size 4.5 x 3.8 x 5.3 cm. \par -Subcentimeter lesion in the lateral/posterolateral right peripheral zone at the base. PI-RADS 4. \par -No extracapsular extension, seminal vesicle invasion, pelvic lymphadenopathy, or bony metastases. \par \par Fusion biopsy done 9/15/2020 showed 5 of 13 cores positive for disease:\par 1. Left anterior apex: Benign. \par 2. Left anterior base: Benign. \par 3. Right anterior apex: Benign. \par 4. Right anterior base: Prostatic adenocarcinoma, [place] 3+3, GG1, involving less than 5% of the tissue. High-grade PIN present.\par 5., Midline apex: Benign. \par 6. Midline, base: Benign. \par 7. Left posterior apex: Benign. \par 8. Left posterior base: Benign. \par 9. Right posterior apex: Prostatic adenocarcinoma, [place] 3+3, GG1, involving 50% of the tissue.\par 10. Right posterior base: Prostatic adenocarcinoma, [place] 3+4, GG2, involving 75% of the tissue discontinuously.\par 11. Left lateral: Benign. \par 12. Right lateral: Prostatic adenocarcinoma, [place] 3+3, GG1, involving 10% of the tissue.\par 13. MRI lesion: Prostatic adenocarcinoma, [place] 3+3,  GG 1 involving discontinuously 20% and 50%  of two of three cores.\par \par On 11/19/2020, he underwent robot-assisted radical prostatectomy with pelvic lymph node dissection. Pathology showed the following: \par 1. Anterior adipose tissue: Adipose and fibrous tissue negative for tumor.\par 2. Radical prostatectomy: Prostatic adenocarcinoma [place]'s pattern 3+4, GG2.\par Extraprostatic extension: Not identified. Urinary bladder, neck, invasion: Not identified. Seminal vesicle invasion: Not identified. All margins uninvolved by invasive carcinoma.\par 3. Left pelvic lymph node: 0/2. \par 4. Right pelvic lymph node: 0/2. \par Pathologic stage classification AJCC eighth edition: pT2N0.\par \par His PSA was 0.23 ng/mL on 1/5/21. His PSA was 0.25 ng/mL on 2/10/21.\par \par PET/CT Axumin done 3/19/21 showed the following: \par -Normal fluciclovine PET scan. No evidence of recurrent or metastatic disease could be identified on this study.\par \par His PSA was 0.26 ng/mL on 4/7/21. \par \par Family history notable for mom with colon cancer.\par \par Patient is a current smoker. He lives in [place] with his wife. He is a retired .

## 2021-12-27 ENCOUNTER — NON-APPOINTMENT (OUTPATIENT)
Age: 71
End: 2021-12-27

## 2021-12-28 LAB
APPEARANCE: ABNORMAL
BACTERIA: NEGATIVE
BILIRUBIN URINE: NEGATIVE
BLOOD URINE: ABNORMAL
CALCIUM OXALATE CRYSTALS: ABNORMAL
COLOR: YELLOW
GLUCOSE QUALITATIVE U: NEGATIVE
HYALINE CASTS: 0 /LPF
KETONES URINE: NORMAL
LEUKOCYTE ESTERASE URINE: NEGATIVE
MICROSCOPIC-UA: NORMAL
NITRITE URINE: NEGATIVE
PH URINE: 6
PROTEIN URINE: ABNORMAL
RED BLOOD CELLS URINE: 10 /HPF
SPECIFIC GRAVITY URINE: 1.03
SQUAMOUS EPITHELIAL CELLS: 1 /HPF
UROBILINOGEN URINE: NORMAL
WHITE BLOOD CELLS URINE: 2 /HPF

## 2021-12-29 ENCOUNTER — NON-APPOINTMENT (OUTPATIENT)
Age: 71
End: 2021-12-29

## 2021-12-29 LAB — BACTERIA UR CULT: NORMAL

## 2021-12-30 LAB — URINE CYTOLOGY: NORMAL

## 2022-01-14 ENCOUNTER — APPOINTMENT (OUTPATIENT)
Dept: UROLOGY | Facility: CLINIC | Age: 72
End: 2022-01-14
Payer: MEDICARE

## 2022-01-14 VITALS — DIASTOLIC BLOOD PRESSURE: 95 MMHG | SYSTOLIC BLOOD PRESSURE: 164 MMHG | HEART RATE: 85 BPM | TEMPERATURE: 97 F

## 2022-01-14 DIAGNOSIS — N39.3 STRESS INCONTINENCE (FEMALE) (MALE): ICD-10-CM

## 2022-01-14 PROCEDURE — 99214 OFFICE O/P EST MOD 30 MIN: CPT

## 2022-01-14 NOTE — HISTORY OF PRESENT ILLNESS
[Currently Experiencing ___] :  [unfilled] [Urinary Incontinence] : urinary incontinence [Post-Void Dribbling] : post-void dribbling [Erectile Dysfunction] : Erectile Dysfunction [FreeTextEntry1] : 71 yr old male with proCa in  2016 with GG 1 PCa, s/p RALP, PLND 11/19/20\par Final path pT2N0, GG2, negative margins.\par Completed salvage XRT 10/2021 + ADT 6 months. seen Dr Mccullough\par returns for follow up with Erectile dysfunction\par Currently on Sildenafil 100 mg - effective \par PSA <0.01\par Complaints occasional  gross hematuria\par Urine cytology - negative for high grade urothelial ca\par He feels good. \par mild bother with urinary incontinence and post void dribble. \par Good FOS\par denies other urinary symptoms

## 2022-01-14 NOTE — ASSESSMENT
[FreeTextEntry1] : Gross hematuria\par after RT\par needs cysto - will defer to [facility]\par \par ED after RP\par Continue Sildenafil 100 mg, - refilled \par finished  XRT and Lupron\par interested in ICI or IPP, will continue with PDE5 for now. \par \par BLU\par continue kegel exercise\par improved symptoms - not bothersome anymore\par does not use pads anymore\par \par Prostate cancer\par Follow up  with [facility] \par \par follow up annually \par \par

## 2022-01-19 ENCOUNTER — APPOINTMENT (OUTPATIENT)
Dept: UROLOGY | Facility: CLINIC | Age: 72
End: 2022-01-19
Payer: MEDICARE

## 2022-01-19 VITALS — HEART RATE: 81 BPM | SYSTOLIC BLOOD PRESSURE: 152 MMHG | DIASTOLIC BLOOD PRESSURE: 90 MMHG | TEMPERATURE: 97.1 F

## 2022-01-19 PROCEDURE — 99214 OFFICE O/P EST MOD 30 MIN: CPT

## 2022-01-19 NOTE — ASSESSMENT
[FreeTextEntry1] : 70yo male s/p RALP, PLND 11/19/20\par Final path pT2N0, GG2, negative margins\par 6 week postop PSA = 0.23, 3 month postop PSA = 0.25\par Completed salvage XRT 10/2021 + ADT 6 months\par Check PSA, testosterone today\par \par Recent gross hematuria\par Normal culture, cytology\par Suspect related to radiation\par F/u for cystoscopy

## 2022-01-19 NOTE — HISTORY OF PRESENT ILLNESS
[FreeTextEntry1] : 68yo male referred by Dr. Montiel for evaluation for MRI fusion biopsy. 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 PSA has been trending up, now 10. Had MRI which shows 47gm prostate, 6mm PI-RADS 4 lesion. \par \par 9/30/20 Here for f/u. Had MRI fusion biopsy. Biopsy shows 5/13 cores positive, [place] 3+4 disease, up to 70% core involved. No complications from biopsy. \par \par 10/14/20 Here for f/u. Patient is interested in proceeding with surgery, here for surgery discussion. \par \par 11/30/20 Here for postop visit. Underwent RALP, PNLD 11/19. Doing well. Final path: pT2N0, Gl 3+4, negative margins. \par \par 1/11/21 Here for postop visit. Had PSA last week = 0.23. 1 pad/day. Started Viagra last week had moderate response. \par \par 2/10/21 Here for f/u. 6 week postop PSA was detectable, returns to repeat PSA today. No erections yet. Experiencing mild incontinence, wears 1 pad per day. \par \par 4/07/21 Here for f/u. 3 month postop PSA was detectable, 0.25, not really consistent with his path pT2N0, 3+4 with negative margins. PET Axumin was performed which is negative. Wearing 1 pad/day. +ED. \par \par 4/21/21 Here to discuss ADT. Wearing 1 pad/day. Plan for salvage radiation when incontinence improves. \par \par 6/2/21: Here for f/u after 1st dose of ADT (4/21/21). Pt c/o hot flashes. Reports good energy levels. Still walking 2-3 miles. Urinary incontinence has improved. No longer needs pads. Per patient, before end of June he will start radiation treatment. \par \par 7/14/21 Here for f/u for 2nd Lupron injection. Doing well. No pads. Using Viagra prn. Has not started radiation yet\par \par 10/18/21 Here for f/u. Completed radiation 2 weeks ago. Feeling fatigue, +hot flashes, otherwise doing OK. Taking Viagra prn. \par \par 1/19/22 Here for f/u. Recent episode gross hematuria, urine cytology, culture negative. + Hot flashes. Rare BLU, no pads. Taking Viagra prn.  [Erectile Dysfunction] : Erectile Dysfunction [Hematuria - Gross] : gross hematuria [None] : None

## 2022-01-19 NOTE — PHYSICAL EXAM
[General Appearance - Well Developed] : well developed [General Appearance - Well Nourished] : well nourished [Normal Appearance] : normal appearance [Well Groomed] : well groomed [General Appearance - In No Acute Distress] : no acute distress [Abdomen Soft] : soft [Abdomen Tenderness] : non-tender [Costovertebral Angle Tenderness] : no ~M costovertebral angle tenderness [] : no rash [Oriented To Time, Place, And Person] : oriented to person, place, and time [Affect] : the affect was normal [Mood] : the mood was normal [Not Anxious] : not anxious [Normal Station and Gait] : the gait and station were normal for the patient's age [No Focal Deficits] : no focal deficits

## 2022-01-20 ENCOUNTER — NON-APPOINTMENT (OUTPATIENT)
Age: 72
End: 2022-01-20

## 2022-01-20 LAB
PSA SERPL-MCNC: <0.01 NG/ML
TESTOST SERPL-MCNC: 99.4 NG/DL

## 2022-01-24 ENCOUNTER — APPOINTMENT (OUTPATIENT)
Dept: UROLOGY | Facility: CLINIC | Age: 72
End: 2022-01-24
Payer: MEDICARE

## 2022-01-24 VITALS
TEMPERATURE: 97.3 F | HEART RATE: 102 BPM | HEIGHT: 70 IN | DIASTOLIC BLOOD PRESSURE: 77 MMHG | BODY MASS INDEX: 25.77 KG/M2 | WEIGHT: 180 LBS | SYSTOLIC BLOOD PRESSURE: 138 MMHG

## 2022-01-24 PROCEDURE — 52000 CYSTOURETHROSCOPY: CPT

## 2022-03-24 ENCOUNTER — APPOINTMENT (OUTPATIENT)
Dept: RADIATION ONCOLOGY | Facility: CLINIC | Age: 72
End: 2022-03-24
Payer: MEDICARE

## 2022-03-24 VITALS
BODY MASS INDEX: 28.27 KG/M2 | HEART RATE: 84 BPM | DIASTOLIC BLOOD PRESSURE: 75 MMHG | TEMPERATURE: 97.7 F | WEIGHT: 197 LBS | OXYGEN SATURATION: 96 % | SYSTOLIC BLOOD PRESSURE: 133 MMHG | RESPIRATION RATE: 16 BRPM

## 2022-03-24 PROCEDURE — 99213 OFFICE O/P EST LOW 20 MIN: CPT

## 2022-03-24 NOTE — PHYSICAL EXAM
[] : no respiratory distress [Normal] : the sclera and conjunctiva were normal, pupils were equal in size, round, reactive to light and extraocular movements were intact. [Outer Ear] : the ears and nose were normal in appearance [Hearing Threshold Finger Rub Not [place]] : hearing was normal [FreeTextEntry1] : declined CHETNA

## 2022-03-24 NOTE — HISTORY OF PRESENT ILLNESS
[FreeTextEntry1] : Mr. Armand Love is a 70 year old male diagnosed with pT2N0 adenocarcinoma of the prostate. He is s/p robot-assisted radical prostatectomy with pelvic lymph node dissection on 11/19/2020, which revealed Cros[place] score 7 (3+4), 0/4 nodes, negative margins. His post-op PSA's have ranged from 0.23- 0.26 ng/mL. He receives Lupron with Dr. Lau and on 7/14/21 and completed 7020 cGY of RT to the Prostate Bed on 10/7/21.\par \par 03/24/2022- RPA- He presents today for follow-up and states he overall feels well. Pt c/o no appreciable symptomatology related to his definitive radiation therapy.  Specifically, he notes baseline urine function with nocturia x1-2. He denies dysuria, but states he has baseline mild leakage and mild stress induced incontinence. He reports strong urine flow and c/o slight urinary frequency and urgency. He experienced hematuria in Dec 2021 and f/u with Dr. Montiel and Sid performed a cystoscopy 1/24/22 which showed post radiation changes and no tumor. He has no blood or mucous in the stool, and denies rectal pain, although he had hemorrhoids that were irritated by his nut consumption, pt states it has resolved now.  He completed ADT therapy in the past. He is unable to have erections and does not use Viagra that was previously prescribed. Pt states he had a near syncopal event when speed walking in Feb 2022 and is currently undergoing a cardiac workup, pt ordered to get cardiac clearance from his cardiologist before he resumes Viagra usage. His PSA was 10.49 ng/mL on 7/9/20, 0.23 ng/mL on 1/5/21, 0.25 ng/mL on 2/10/21, 0.26 ng/mL on 4/7/21, <0.01 ng/ml on 6/2/21, <0.01 ng/ml on 1/19/22 with associated Total Testosterone of 99.4 ng/dL. \par \par \par 11/17/2021- PTE- He presents today for follow-up and states he overall feels well. Pt c/o no appreciable symptomatology related to his definitive radiation therapy.  Specifically, he notes baseline urine function with nocturia x1-2, while not using Flomax 0.4mg at night.  He denies dysuria, but c/o leakage or incontinence with heavy lifting and coughing which is baseline. He has good urine flow and denies urinary frequency or urgency. He denies blood in the urine.  He has no blood or mucous in the stool, and denies rectal pain.  He denied ADT therapy in the past. He is able to have erections and uses Viagra with good result. He last met with his Urologist, Dr. Lau on 10/18/21, with a benign physical examination. He received 2nd and last Lupron on 7/14/21 and continues to c/o hot flashes.\par \par \par ONC Hx:\par -------------\par He established care with Dr. Monteil on 10/17/17, and as per Dr. Montiel's note, he had a history of Crosbyton 6 prostate cancer with last biopsy August 2016 and undergoing Active Surveillance. \par \par PSA was 4.96 ng/mL on 10/17/17. TRUS guided prostate biopsy done 3/13/18 showed the following pathology: \par 1. Right prostate: Focal high grade PIN.\par 2. Left prostate: Two small foci suspicious for prostatic adenocarcinoma however outpouchings of high grade PIN\par cannot be entirely ruled out. PIN4 immunohistochemistry shows an absence of basal cells in the suspicious foci (absence of staining with K903 and p63) and positivity with p504s supporting this diagnosis.\par \par Prostate MRI done 10/30/17 showed the following: \par -Prostate size 5 x 4.4 x 2.9 cm, with volume of 33.2 mL. \par -With respect to peripheral zone PI-RADS 1. \par -With respect to the central gland/ transition zone PI-RADS 2. \par \par PSA trend:\par 9/13/18- 6.95 ng/mL\par 3/12/19- 6.75 ng/mL\par \par Prostate MRI done 4/1/19 showed the following: \par -Prostate measures 4 x 5.1 x 4.2 cm, with volume of 43 mL. \par -PI-RADS 2. \par \par PSA trend: \par 1/7/2020- 9.79 ng/mL\par 7/9/2020- 10.4 ng/mL\par \par Prostate MRI done 7/30/2020 revealed the following: \par -Prostate size 4.5 x 3.8 x 5.3 cm. \par -Subcentimeter lesion in the lateral/posterolateral right peripheral zone at the base. PI-RADS 4. \par -No extracapsular extension, seminal vesicle invasion, pelvic lymphadenopathy, or bony metastases. \par \par Fusion biopsy done 9/15/2020 showed 5 of 13 cores positive for disease:\par 1. Left anterior apex: Benign. \par 2. Left anterior base: Benign. \par 3. Right anterior apex: Benign. \par 4. Right anterior base: Prostatic adenocarcinoma, Reena 3+3, GG1, involving less than 5% of the tissue. High-grade PIN present.\par 5., Midline apex: Benign. \par 6. Midline, base: Benign. \par 7. Left posterior apex: Benign. \par 8. Left posterior base: Benign. \par 9. Right posterior apex: Prostatic adenocarcinoma, [place] 3+3, GG1, involving 50% of the tissue.\par 10. Right posterior base: Prostatic adenocarcinoma, [place] 3+4, GG2, involving 75% of the tissue discontinuously.\par 11. Left lateral: Benign. \par 12. Right lateral: Prostatic adenocarcinoma, [place] 3+3, GG1, involving 10% of the tissue.\par 13. MRI lesion: Prostatic adenocarcinoma, [place] 3+3,  GG 1 involving discontinuously 20% and 50%  of two of three cores.\par \par On 11/19/2020, he underwent robot-assisted radical prostatectomy with pelvic lymph node dissection. Pathology showed the following: \par 1. Anterior adipose tissue: Adipose and fibrous tissue negative for tumor.\par 2. Radical prostatectomy: Prostatic adenocarcinoma Crosby[place]'s pattern 3+4, GG2.\par Extraprostatic extension: Not identified. Urinary bladder, neck, invasion: Not identified. Seminal vesicle invasion: Not identified. All margins uninvolved by invasive carcinoma.\par 3. Left pelvic lymph node: 0/2. \par 4. Right pelvic lymph node: 0/2. \par Pathologic stage classification AJCC eighth edition: pT2N0.\par \par His PSA was 0.23 ng/mL on 1/5/21. His PSA was 0.25 ng/mL on 2/10/21.\par \par PET/CT Axumin done 3/19/21 showed the following: \par -Normal fluciclovine PET scan. No evidence of recurrent or metastatic disease could be identified on this study.\par \par His PSA was 0.26 ng/mL on 4/7/21. \par \par Family history notable for mom with colon cancer.\par \par Patient is a current smoker. He lives in [place] with his wife. He is a retired .

## 2022-03-24 NOTE — REVIEW OF SYSTEMS
Report given to night shift RN.   [Nocturia] : nocturia [IPSS Score (0-40): ___] : IPSS score: [unfilled] [Negative] : Psychiatric [Chest Pain] : no chest pain [Palpitations] : no palpitations [FreeTextEntry5] : recent near syncopal event, pt getting worked up

## 2022-03-24 NOTE — DISEASE MANAGEMENT
[] : Patient had a Prostate MRI [4] : 4 [IIB] : IIB [Patient had a radical prostatectomy] : Patient had a radical prostatectomy  [7(3+4)] : Reena Score 7(3+4) [Negative] : Negative margins [2] : T2 [0] : N0 [Pathological] : TNM Stage: p [TotalCores] : 13 [TotalPositiveCores] : 5 [MaxCoreInvolvement] : 75 [RadicalProstatectomyDate] : 11/19/2020 [TotalNumberofnodesresected] : 4 [PositiveNodes] : 0 [FreeTextEntry4] : recurrent prostate ca [TTNM] : x [NTNM] : x [MTNM] : x [de-identified] : 8033 [de-identified] : 6878 [de-identified] : Prostate bed

## 2022-03-31 ENCOUNTER — OUTPATIENT (OUTPATIENT)
Dept: OUTPATIENT SERVICES | Facility: HOSPITAL | Age: 72
LOS: 1 days | End: 2022-03-31
Payer: MEDICARE

## 2022-03-31 DIAGNOSIS — Z01.810 ENCOUNTER FOR PREPROCEDURAL CARDIOVASCULAR EXAMINATION: ICD-10-CM

## 2022-03-31 DIAGNOSIS — I10 ESSENTIAL (PRIMARY) HYPERTENSION: ICD-10-CM

## 2022-03-31 DIAGNOSIS — R06.02 SHORTNESS OF BREATH: ICD-10-CM

## 2022-03-31 DIAGNOSIS — R07.9 CHEST PAIN, UNSPECIFIED: ICD-10-CM

## 2022-03-31 DIAGNOSIS — Z98.890 OTHER SPECIFIED POSTPROCEDURAL STATES: Chronic | ICD-10-CM

## 2022-03-31 PROCEDURE — 93018 CV STRESS TEST I&R ONLY: CPT

## 2022-03-31 PROCEDURE — A9500: CPT

## 2022-03-31 PROCEDURE — 78452 HT MUSCLE IMAGE SPECT MULT: CPT

## 2022-03-31 PROCEDURE — A9505: CPT

## 2022-03-31 PROCEDURE — 78452 HT MUSCLE IMAGE SPECT MULT: CPT | Mod: 26

## 2022-03-31 PROCEDURE — 93017 CV STRESS TEST TRACING ONLY: CPT

## 2022-03-31 PROCEDURE — 93016 CV STRESS TEST SUPVJ ONLY: CPT

## 2022-04-01 ENCOUNTER — NON-APPOINTMENT (OUTPATIENT)
Age: 72
End: 2022-04-01

## 2022-04-03 ENCOUNTER — NON-APPOINTMENT (OUTPATIENT)
Age: 72
End: 2022-04-03

## 2022-04-04 ENCOUNTER — NON-APPOINTMENT (OUTPATIENT)
Age: 72
End: 2022-04-04

## 2022-04-05 ENCOUNTER — EMERGENCY (EMERGENCY)
Facility: HOSPITAL | Age: 72
LOS: 1 days | Discharge: HOME | End: 2022-04-05
Attending: EMERGENCY MEDICINE | Admitting: EMERGENCY MEDICINE
Payer: MEDICARE

## 2022-04-05 ENCOUNTER — NON-APPOINTMENT (OUTPATIENT)
Age: 72
End: 2022-04-05

## 2022-04-05 VITALS
SYSTOLIC BLOOD PRESSURE: 155 MMHG | TEMPERATURE: 98 F | RESPIRATION RATE: 18 BRPM | OXYGEN SATURATION: 99 % | DIASTOLIC BLOOD PRESSURE: 67 MMHG | HEART RATE: 75 BPM

## 2022-04-05 VITALS
TEMPERATURE: 98 F | WEIGHT: 184.97 LBS | RESPIRATION RATE: 18 BRPM | SYSTOLIC BLOOD PRESSURE: 145 MMHG | HEART RATE: 76 BPM | OXYGEN SATURATION: 98 % | HEIGHT: 70 IN | DIASTOLIC BLOOD PRESSURE: 83 MMHG

## 2022-04-05 DIAGNOSIS — N30.91 CYSTITIS, UNSPECIFIED WITH HEMATURIA: ICD-10-CM

## 2022-04-05 DIAGNOSIS — Z98.890 OTHER SPECIFIED POSTPROCEDURAL STATES: Chronic | ICD-10-CM

## 2022-04-05 DIAGNOSIS — Z92.3 PERSONAL HISTORY OF IRRADIATION: ICD-10-CM

## 2022-04-05 DIAGNOSIS — Z85.46 PERSONAL HISTORY OF MALIGNANT NEOPLASM OF PROSTATE: ICD-10-CM

## 2022-04-05 DIAGNOSIS — Z20.822 CONTACT WITH AND (SUSPECTED) EXPOSURE TO COVID-19: ICD-10-CM

## 2022-04-05 DIAGNOSIS — E78.5 HYPERLIPIDEMIA, UNSPECIFIED: ICD-10-CM

## 2022-04-05 DIAGNOSIS — I10 ESSENTIAL (PRIMARY) HYPERTENSION: ICD-10-CM

## 2022-04-05 DIAGNOSIS — R31.9 HEMATURIA, UNSPECIFIED: ICD-10-CM

## 2022-04-05 LAB
ALBUMIN SERPL ELPH-MCNC: 4.6 G/DL — SIGNIFICANT CHANGE UP (ref 3.3–5)
ALP SERPL-CCNC: 73 U/L — SIGNIFICANT CHANGE UP (ref 40–120)
ALT FLD-CCNC: 9 U/L — LOW (ref 10–45)
ANION GAP SERPL CALC-SCNC: 11 MMOL/L — SIGNIFICANT CHANGE UP (ref 5–17)
APPEARANCE UR: ABNORMAL
AST SERPL-CCNC: 10 U/L — SIGNIFICANT CHANGE UP (ref 10–40)
BACTERIA # UR AUTO: PRESENT /HPF
BASOPHILS # BLD AUTO: 0.08 K/UL — SIGNIFICANT CHANGE UP (ref 0–0.2)
BASOPHILS NFR BLD AUTO: 1.1 % — SIGNIFICANT CHANGE UP (ref 0–2)
BILIRUB SERPL-MCNC: 0.2 MG/DL — SIGNIFICANT CHANGE UP (ref 0.2–1.2)
BILIRUB UR-MCNC: NEGATIVE — SIGNIFICANT CHANGE UP
BUN SERPL-MCNC: 15 MG/DL — SIGNIFICANT CHANGE UP (ref 7–23)
CALCIUM SERPL-MCNC: 9.6 MG/DL — SIGNIFICANT CHANGE UP (ref 8.4–10.5)
CHLORIDE SERPL-SCNC: 104 MMOL/L — SIGNIFICANT CHANGE UP (ref 96–108)
CO2 SERPL-SCNC: 26 MMOL/L — SIGNIFICANT CHANGE UP (ref 22–31)
COLOR SPEC: ABNORMAL
COMMENT - URINE: SIGNIFICANT CHANGE UP
CREAT SERPL-MCNC: 1.06 MG/DL — SIGNIFICANT CHANGE UP (ref 0.5–1.3)
DIFF PNL FLD: ABNORMAL
EGFR: 75 ML/MIN/1.73M2 — SIGNIFICANT CHANGE UP
EOSINOPHIL # BLD AUTO: 0.05 K/UL — SIGNIFICANT CHANGE UP (ref 0–0.5)
EOSINOPHIL NFR BLD AUTO: 0.7 % — SIGNIFICANT CHANGE UP (ref 0–6)
EPI CELLS # UR: SIGNIFICANT CHANGE UP /HPF (ref 0–5)
GLUCOSE SERPL-MCNC: 120 MG/DL — HIGH (ref 70–99)
GLUCOSE UR QL: NEGATIVE — SIGNIFICANT CHANGE UP
HCT VFR BLD CALC: 39.1 % — SIGNIFICANT CHANGE UP (ref 39–50)
HGB BLD-MCNC: 12.9 G/DL — LOW (ref 13–17)
IMM GRANULOCYTES NFR BLD AUTO: 0.3 % — SIGNIFICANT CHANGE UP (ref 0–1.5)
KETONES UR-MCNC: NEGATIVE — SIGNIFICANT CHANGE UP
LEUKOCYTE ESTERASE UR-ACNC: NEGATIVE — SIGNIFICANT CHANGE UP
LYMPHOCYTES # BLD AUTO: 1.49 K/UL — SIGNIFICANT CHANGE UP (ref 1–3.3)
LYMPHOCYTES # BLD AUTO: 20.9 % — SIGNIFICANT CHANGE UP (ref 13–44)
MCHC RBC-ENTMCNC: 28.9 PG — SIGNIFICANT CHANGE UP (ref 27–34)
MCHC RBC-ENTMCNC: 33 GM/DL — SIGNIFICANT CHANGE UP (ref 32–36)
MCV RBC AUTO: 87.7 FL — SIGNIFICANT CHANGE UP (ref 80–100)
MONOCYTES # BLD AUTO: 0.56 K/UL — SIGNIFICANT CHANGE UP (ref 0–0.9)
MONOCYTES NFR BLD AUTO: 7.9 % — SIGNIFICANT CHANGE UP (ref 2–14)
NEUTROPHILS # BLD AUTO: 4.92 K/UL — SIGNIFICANT CHANGE UP (ref 1.8–7.4)
NEUTROPHILS NFR BLD AUTO: 69.1 % — SIGNIFICANT CHANGE UP (ref 43–77)
NITRITE UR-MCNC: NEGATIVE — SIGNIFICANT CHANGE UP
NRBC # BLD: 0 /100 WBCS — SIGNIFICANT CHANGE UP (ref 0–0)
PH UR: 6 — SIGNIFICANT CHANGE UP (ref 5–8)
PLATELET # BLD AUTO: 300 K/UL — SIGNIFICANT CHANGE UP (ref 150–400)
POTASSIUM SERPL-MCNC: 3.3 MMOL/L — LOW (ref 3.5–5.3)
POTASSIUM SERPL-SCNC: 3.3 MMOL/L — LOW (ref 3.5–5.3)
PROT SERPL-MCNC: 7.2 G/DL — SIGNIFICANT CHANGE UP (ref 6–8.3)
PROT UR-MCNC: 30 MG/DL
RBC # BLD: 4.46 M/UL — SIGNIFICANT CHANGE UP (ref 4.2–5.8)
RBC # FLD: 13 % — SIGNIFICANT CHANGE UP (ref 10.3–14.5)
RBC CASTS # UR COMP ASSIST: < 5 /HPF — SIGNIFICANT CHANGE UP
SARS-COV-2 RNA SPEC QL NAA+PROBE: NEGATIVE — SIGNIFICANT CHANGE UP
SODIUM SERPL-SCNC: 141 MMOL/L — SIGNIFICANT CHANGE UP (ref 135–145)
SP GR SPEC: 1.02 — SIGNIFICANT CHANGE UP (ref 1–1.03)
UROBILINOGEN FLD QL: 0.2 E.U./DL — SIGNIFICANT CHANGE UP
WBC # BLD: 7.12 K/UL — SIGNIFICANT CHANGE UP (ref 3.8–10.5)
WBC # FLD AUTO: 7.12 K/UL — SIGNIFICANT CHANGE UP (ref 3.8–10.5)
WBC UR QL: > 10 /HPF

## 2022-04-05 PROCEDURE — 74178 CT ABD&PLV WO CNTR FLWD CNTR: CPT | Mod: MA

## 2022-04-05 PROCEDURE — 87181 SC STD AGAR DILUTION PER AGT: CPT

## 2022-04-05 PROCEDURE — 99284 EMERGENCY DEPT VISIT MOD MDM: CPT | Mod: 25

## 2022-04-05 PROCEDURE — 36415 COLL VENOUS BLD VENIPUNCTURE: CPT

## 2022-04-05 PROCEDURE — 96374 THER/PROPH/DIAG INJ IV PUSH: CPT | Mod: XU

## 2022-04-05 PROCEDURE — 87186 SC STD MICRODIL/AGAR DIL: CPT

## 2022-04-05 PROCEDURE — 80053 COMPREHEN METABOLIC PANEL: CPT

## 2022-04-05 PROCEDURE — 81001 URINALYSIS AUTO W/SCOPE: CPT

## 2022-04-05 PROCEDURE — 74178 CT ABD&PLV WO CNTR FLWD CNTR: CPT | Mod: 26,MA

## 2022-04-05 PROCEDURE — 87086 URINE CULTURE/COLONY COUNT: CPT

## 2022-04-05 PROCEDURE — 87635 SARS-COV-2 COVID-19 AMP PRB: CPT

## 2022-04-05 PROCEDURE — 99285 EMERGENCY DEPT VISIT HI MDM: CPT

## 2022-04-05 PROCEDURE — 87184 SC STD DISK METHOD PER PLATE: CPT

## 2022-04-05 PROCEDURE — 85025 COMPLETE CBC W/AUTO DIFF WBC: CPT

## 2022-04-05 RX ORDER — CEFPODOXIME PROXETIL 100 MG
1 TABLET ORAL
Qty: 20 | Refills: 0
Start: 2022-04-05 | End: 2022-04-14

## 2022-04-05 RX ORDER — CEFTRIAXONE 500 MG/1
1000 INJECTION, POWDER, FOR SOLUTION INTRAMUSCULAR; INTRAVENOUS ONCE
Refills: 0 | Status: COMPLETED | OUTPATIENT
Start: 2022-04-05 | End: 2022-04-05

## 2022-04-05 RX ORDER — POTASSIUM CHLORIDE 20 MEQ
40 PACKET (EA) ORAL ONCE
Refills: 0 | Status: COMPLETED | OUTPATIENT
Start: 2022-04-05 | End: 2022-04-05

## 2022-04-05 RX ADMIN — CEFTRIAXONE 100 MILLIGRAM(S): 500 INJECTION, POWDER, FOR SOLUTION INTRAMUSCULAR; INTRAVENOUS at 16:55

## 2022-04-05 RX ADMIN — Medication 40 MILLIEQUIVALENT(S): at 16:55

## 2022-04-05 NOTE — ED ADULT TRIAGE NOTE - CHIEF COMPLAINT QUOTE
x 6 days of hematuria. denies fevers, pelvic pain, abdominal pain, n/v/d. x 6 days of hematuria. denies fevers, pelvic pain, abdominal pain, n/v/d. no blood thinners.

## 2022-04-05 NOTE — CONSULT NOTE ADULT - SUBJECTIVE AND OBJECTIVE BOX
CONSULT NOTE:    HPI: 70 yo m pmhx htn, hld, CaP underwent RALP 2020, radiation 2021. Presenting to ER with 4 days of intermittent hematuria without clots. Denies any dysuria, frequency or urgency. Denies any difficulty emptying his bladder. Denies any fever or chills. Denies any abd pain or flank pain. C/O some spells of low energy, denies any lightheadedness, sob or CP. Ststea he is currently undergoing an outpt cardiac work up for his recent decrease in energy levels.       Vital Signs Last 24 Hrs  T(C): 36.9 (05 Apr 2022 14:31), Max: 36.9 (05 Apr 2022 14:31)  T(F): 98.5 (05 Apr 2022 14:31), Max: 98.5 (05 Apr 2022 14:31)  HR: 76 (05 Apr 2022 14:31) (76 - 76)  BP: 145/83 (05 Apr 2022 14:31) (145/83 - 145/83)  BP(mean): --  RR: 18 (05 Apr 2022 14:31) (18 - 18)  SpO2: 98% (05 Apr 2022 14:31) (98% - 98%)  I&O's Summary      PE:  Gen: NAD  Abd: soft, nd, nt no cva ttp  : urine changing between tea colored and clear blood tinged, without clots  CHETNA:    LABS:                        12.9   7.12  )-----------( 300      ( 05 Apr 2022 15:14 )             39.1     04-05    141  |  104  |  15  ----------------------------<  120<H>  3.3<L>   |  26  |  1.06    Ca    9.6      05 Apr 2022 15:14    TPro  7.2  /  Alb  4.6  /  TBili  0.2  /  DBili  x   /  AST  10  /  ALT  9<L>  /  AlkPhos  73  04-05      Cultures      A/P 70 yo m with hematuria  1) CT shows signs of cystitis, kidneys/bladder within normal  2) hgb stable  3) cr at baseline  4) patent feels well and would like to go home and follow up outpt  CONSULT NOTE:    HPI: 70 yo m pmhx htn, hld, CaP underwent RALP 2020, radiation 2021. Presenting to ER with 4 days of intermittent hematuria without clots. Denies any dysuria, frequency or urgency. Denies any difficulty emptying his bladder. Denies any fever or chills. Denies any abd pain or flank pain. C/O some spells of low energy, denies any lightheadedness, sob or CP. Ststea he is currently undergoing an outpt cardiac work up for his recent decrease in energy levels.       Vital Signs Last 24 Hrs  T(C): 36.9 (05 Apr 2022 14:31), Max: 36.9 (05 Apr 2022 14:31)  T(F): 98.5 (05 Apr 2022 14:31), Max: 98.5 (05 Apr 2022 14:31)  HR: 76 (05 Apr 2022 14:31) (76 - 76)  BP: 145/83 (05 Apr 2022 14:31) (145/83 - 145/83)  BP(mean): --  RR: 18 (05 Apr 2022 14:31) (18 - 18)  SpO2: 98% (05 Apr 2022 14:31) (98% - 98%)  I&O's Summary      PE:  Gen: NAD  Abd: soft, nd, nt no cva ttp  : urine changing between tea colored and clear blood tinged, without clots  CHETNA:    LABS:                        12.9   7.12  )-----------( 300      ( 05 Apr 2022 15:14 )             39.1     04-05    141  |  104  |  15  ----------------------------<  120<H>  3.3<L>   |  26  |  1.06    Ca    9.6      05 Apr 2022 15:14    TPro  7.2  /  Alb  4.6  /  TBili  0.2  /  DBili  x   /  AST  10  /  ALT  9<L>  /  AlkPhos  73  04-05      Cultures      A/P 70 yo m with hematuria  1) CT shows signs of cystitis, kidneys/bladder within normal  2) hgb stable  3) cr at baseline  4) patent feels well and would like to go home and follow up outpt  5) would rec GI consult if any concern for patient description of rectal bleeding

## 2022-04-05 NOTE — ED PROVIDER NOTE - PATIENT PORTAL LINK FT
You can access the FollowMyHealth Patient Portal offered by [facility] by registering at the following website: [email or web address]. By joining [employer]’s FollowMyHealth portal, you will also be able to view your health information using other applications (apps) compatible with our system.

## 2022-04-05 NOTE — ED PROVIDER NOTE - CLINICAL SUMMARY MEDICAL DECISION MAKING FREE TEXT BOX
hematuria, painless, no assoc pelvic abbd pain  -labs, UA, reassess. hematuria, painless, no assoc pelvic abd pain  -labs, UA, CT, uro consult reassess.  Pt later stating episode of blood in stool yesterday, thinks from hemorrhoids, no active bleeding on exam, brown stool, cbc/hds. UA+/cystitis on CT, disc w uro, will dc w abx, fu as outpt, Discussed with pt results of work up, strict return precautions for any worsening sx, and need for follow up.  Pt expressed understanding and agrees with plan.

## 2022-04-05 NOTE — ED PROVIDER NOTE - PHYSICAL EXAMINATION

## 2022-04-05 NOTE — ED ADULT NURSE NOTE - OBJECTIVE STATEMENT
Patient presents to the ED complaining of bloody urine for the past 5-7days. Patient states that he has a history of prostate cancer. Denies any fever or chills.

## 2022-04-05 NOTE — ED PROVIDER NOTE - OBJECTIVE STATEMENT
70yo hx of prostate CA s/p radiation therapy OCt 2021 w complaints of hematuria for the past 5-6 days. Chronic fatigue over the last two months. Denies pelvic, abd pain, cough, sob, chest pain, rash or any other acute complaints. 70yo hx of prostate CA s/p radiation therapy Oct 2021 w complaints of hematuria for the past 5-6 days. Chronic fatigue over the last two months. Denies pelvic, abd pain, cough, sob, chest pain, rash, urethral dc or any other acute complaints. Describes hematuria as dark red in nature. Pt has not tried anything for symptoms, no other aggravating or relieving factors.

## 2022-04-05 NOTE — ED PROVIDER NOTE - NS ED MD DISPO DISCHARGE CCDA
Large Joint Aspiration/Injection  Date/Time: 11/17/2017 10:30 PM  Performed by: DEE WILLIAMSON  Authorized by: DEE WILLIAMSON     Consent Done?:  Yes (Verbal)  Indications:  Pain  Timeout: Prior to procedure the correct patient, procedure, and site was verified      Location:  Knee  Site:  R knee and L knee  Prep: Patient was prepped and draped in usual sterile fashion    Approach:  Anteromedial  Medications:  20 mg EUFLEXXA 10 mg/mL(mw 2.4 -3.6 million); 20 mg EUFLEXXA 10 mg/mL(mw 2.4 -3.6 million)       Patient/Caregiver provided printed discharge information.

## 2022-04-08 LAB
-  AMPICILLIN: SIGNIFICANT CHANGE UP
-  CEFTRIAXONE: SIGNIFICANT CHANGE UP
-  CIPROFLOXACIN: SIGNIFICANT CHANGE UP
-  CLINDAMYCIN: SIGNIFICANT CHANGE UP
-  ERYTHROMYCIN: SIGNIFICANT CHANGE UP
-  LEVOFLOXACIN: SIGNIFICANT CHANGE UP
-  LEVOFLOXACIN: SIGNIFICANT CHANGE UP
-  PENICILLIN: SIGNIFICANT CHANGE UP
-  TETRACYCLINE: SIGNIFICANT CHANGE UP
-  VANCOMYCIN: SIGNIFICANT CHANGE UP
-  VANCOMYCIN: SIGNIFICANT CHANGE UP
CULTURE RESULTS: SIGNIFICANT CHANGE UP
METHOD TYPE: SIGNIFICANT CHANGE UP
ORGANISM # SPEC MICROSCOPIC CNT: SIGNIFICANT CHANGE UP
SPECIMEN SOURCE: SIGNIFICANT CHANGE UP

## 2022-04-27 ENCOUNTER — APPOINTMENT (OUTPATIENT)
Dept: UROLOGY | Facility: CLINIC | Age: 72
End: 2022-04-27
Payer: MEDICARE

## 2022-04-27 VITALS — DIASTOLIC BLOOD PRESSURE: 102 MMHG | HEART RATE: 75 BPM | SYSTOLIC BLOOD PRESSURE: 146 MMHG | TEMPERATURE: 98.2 F

## 2022-04-27 PROCEDURE — 99213 OFFICE O/P EST LOW 20 MIN: CPT

## 2022-04-27 NOTE — PHYSICAL EXAM
[General Appearance - Well Developed] : well developed [General Appearance - Well Nourished] : well nourished [Normal Appearance] : normal appearance [Well Groomed] : well groomed [General Appearance - In No Acute Distress] : no acute distress [Affect] : the affect was normal [Oriented To Time, Place, And Person] : oriented to person, place, and time [Mood] : the mood was normal [Not Anxious] : not anxious [Normal Station and Gait] : the gait and station were normal for the patient's age [No Focal Deficits] : no focal deficits [Heart Rate And Rhythm] : Heart rate and rhythm were normal [] : no respiratory distress [Exaggerated Use Of Accessory Muscles For Inspiration] : no accessory muscle use

## 2022-04-28 ENCOUNTER — NON-APPOINTMENT (OUTPATIENT)
Age: 72
End: 2022-04-28

## 2022-04-28 LAB
PSA SERPL-MCNC: <0.01 NG/ML
TESTOST SERPL-MCNC: 200 NG/DL

## 2022-04-28 NOTE — ASSESSMENT
[FreeTextEntry1] : 70yo male s/p RALP, PLND 11/19/20\par Final path pT2N0, GG2, negative margins\par Completed salvage XRT 10/2021 + ADT 6 months. \par PSA has been undetectable \par \par Recent UTI, gross hematuria\par resolved\par Neg work up for gross hematuria up 3 months neg \par Suspect related to radiation\par \par Check PSA, testosterone today\par  \par Follow up 3 mon

## 2022-04-28 NOTE — REVIEW OF SYSTEMS
[see HPI] : see HPI [Hot Flashes] : hot flashes [Erectile Dysfunction] : erectile dysfunction [Negative] : Heme/Lymph [Fever] : no fever [Chills] : no chills

## 2022-04-28 NOTE — END OF VISIT
[FreeTextEntry3] : I, Dr. Lau, personally performed the evaluation and management (E/M) services for this established patient who presents today with (a) new problem(s)/exacerbation of (an) existing condition(s).  That E/M includes conducting the examination, assessing all new/exacerbated conditions, and establishing a new plan of care.  Today, our ACP, Jacklyn Vail, was here to observe the evaluation and management services for this new problem/exacerbated condition to be followed going forward.\par

## 2022-04-28 NOTE — HISTORY OF PRESENT ILLNESS
[Erectile Dysfunction] : Erectile Dysfunction [None] : None [FreeTextEntry1] : 68yo male referred by Dr. Montiel for evaluation for MRI fusion biopsy. 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 PSA has been trending up, now 10. Had MRI which shows 47gm prostate, 6mm PI-RADS 4 lesion. \par \par 9/30/20 Here for f/u. Had MRI fusion biopsy. Biopsy shows 5/13 cores positive, [place] 3+4 disease, up to 70% core involved. No complications from biopsy. \par \par 10/14/20 Here for f/u. Patient is interested in proceeding with surgery, here for surgery discussion. \par \par 11/30/20 Here for postop visit. Underwent RALP, PNLD 11/19. Doing well. Final path: pT2N0, Gl 3+4, negative margins. \par \par 1/11/21 Here for postop visit. Had PSA last week = 0.23. 1 pad/day. Started Viagra last week had moderate response. \par \par 2/10/21 Here for f/u. 6 week postop PSA was detectable, returns to repeat PSA today. No erections yet. Experiencing mild incontinence, wears 1 pad per day. \par \par 4/07/21 Here for f/u. 3 month postop PSA was detectable, 0.25, not really consistent with his path pT2N0, 3+4 with negative margins. PET Axumin was performed which is negative. Wearing 1 pad/day. +ED. \par \par \par 4/21/21 Here to discuss ADT. Wearing 1 pad/day. Plan for salvage radiation when incontinence improves. \par \par 6/2/21: Here for f/u after 1st dose of ADT (4/21/21). Pt c/o hot flashes. Reports good energy levels. Still walking 2-3 miles. Urinary incontinence has improved. No longer needs pads. Per patient, before end of June he will start radiation treatment. \par \par 7/14/21 Here for f/u for 2nd Lupron injection. Doing well. No pads. Using Viagra prn. Has not started radiation yet\par \par 10/18/21 Here for f/u. Completed radiation 2 weeks ago. Feeling fatigue, +hot flashes, otherwise doing OK. Taking Viagra prn. \par \par 1/19/22 Here for f/u. Recent episode gross hematuria, urine cytology, culture negative. + Hot flashes. Rare BLU, no pads. Taking Viagra prn. \par \par 4/27/22 Here for f/u. Doing well. Recent UTI associated with gross hematuria. Treated with abx. Reports Ucx 1 week ago with PCP neg. He is feeling well today, denies dysuria, hematuria today. ED improved on sildenafil.

## 2022-07-25 ENCOUNTER — APPOINTMENT (OUTPATIENT)
Dept: UROLOGY | Facility: CLINIC | Age: 72
End: 2022-07-25

## 2022-07-25 VITALS — SYSTOLIC BLOOD PRESSURE: 155 MMHG | TEMPERATURE: 98.3 F | DIASTOLIC BLOOD PRESSURE: 84 MMHG | HEART RATE: 83 BPM

## 2022-07-25 PROCEDURE — 99213 OFFICE O/P EST LOW 20 MIN: CPT

## 2022-07-25 NOTE — ASSESSMENT
[FreeTextEntry1] : 72yo male s/p RALP, PLND 11/19/20\par Final path pT2N0, GG2, negative margins\par Completed salvage XRT 10/2021 + ADT 6 months. \par PSA has been undetectable 4/2022\par \par Previous UTI, gross hematuria\par No interval issues\par Neg work up for gross hematuria\par Suspect related to radiation\par \par Check PSA, testosterone today\par  \par Follow up 3 mon

## 2022-07-25 NOTE — HISTORY OF PRESENT ILLNESS
[Erectile Dysfunction] : Erectile Dysfunction [None] : None [FreeTextEntry1] : 68yo male referred by Dr. Montiel for evaluation for MRI fusion biopsy. 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 PSA has been trending up, now 10. Had MRI which shows 47gm prostate, 6mm PI-RADS 4 lesion. \par \par 9/30/20 Here for f/u. Had MRI fusion biopsy. Biopsy shows 5/13 cores positive, [place] 3+4 disease, up to 70% core involved. No complications from biopsy. \par \par 10/14/20 Here for f/u. Patient is interested in proceeding with surgery, here for surgery discussion. \par \par 11/30/20 Here for postop visit. Underwent RALP, PNLD 11/19. Doing well. Final path: pT2N0, Gl 3+4, negative margins. \par \par 1/11/21 Here for postop visit. Had PSA last week = 0.23. 1 pad/day. Started Viagra last week had moderate response. \par \par 2/10/21 Here for f/u. 6 week postop PSA was detectable, returns to repeat PSA today. No erections yet. Experiencing mild incontinence, wears 1 pad per day. \par \par 4/07/21 Here for f/u. 3 month postop PSA was detectable, 0.25, not really consistent with his path pT2N0, 3+4 with negative margins. PET Axumin was performed which is negative. Wearing 1 pad/day. +ED. \par \par \par 4/21/21 Here to discuss ADT. Wearing 1 pad/day. Plan for salvage radiation when incontinence improves. \par \par 6/2/21: Here for f/u after 1st dose of ADT (4/21/21). Pt c/o hot flashes. Reports good energy levels. Still walking 2-3 miles. Urinary incontinence has improved. No longer needs pads. Per patient, before end of June he will start radiation treatment. \par \par 7/14/21 Here for f/u for 2nd Lupron injection. Doing well. No pads. Using Viagra prn. Has not started radiation yet\par \par 10/18/21 Here for f/u. Completed radiation 2 weeks ago. Feeling fatigue, +hot flashes, otherwise doing OK. Taking Viagra prn. \par \par 1/19/22 Here for f/u. Recent episode gross hematuria, urine cytology, culture negative. + Hot flashes. Rare BLU, no pads. Taking Viagra prn. \par \par 4/27/22 Here for f/u. Doing well. Recent UTI associated with gross hematuria. Treated with abx. Reports Ucx 1 week ago with PCP neg. He is feeling well today, denies dysuria, hematuria today. ED improved on sildenafil. \par \par 7/25/22 Here for f/u. Doing well, no interval hematuria or UTI. Last labs 4/2022: PSA undetectable, T = 200

## 2022-07-26 ENCOUNTER — NON-APPOINTMENT (OUTPATIENT)
Age: 72
End: 2022-07-26

## 2022-07-26 LAB
PSA SERPL-MCNC: <0.01 NG/ML
TESTOST SERPL-MCNC: 248 NG/DL

## 2022-08-02 ENCOUNTER — NON-APPOINTMENT (OUTPATIENT)
Age: 72
End: 2022-08-02

## 2022-09-21 ENCOUNTER — APPOINTMENT (OUTPATIENT)
Dept: RADIATION ONCOLOGY | Facility: CLINIC | Age: 72
End: 2022-09-21

## 2022-10-24 ENCOUNTER — APPOINTMENT (OUTPATIENT)
Dept: UROLOGY | Facility: CLINIC | Age: 72
End: 2022-10-24

## 2022-10-24 VITALS — TEMPERATURE: 97.5 F | SYSTOLIC BLOOD PRESSURE: 136 MMHG | DIASTOLIC BLOOD PRESSURE: 87 MMHG | HEART RATE: 79 BPM

## 2022-10-24 PROCEDURE — 99213 OFFICE O/P EST LOW 20 MIN: CPT

## 2022-10-24 NOTE — HISTORY OF PRESENT ILLNESS
[Erectile Dysfunction] : Erectile Dysfunction [None] : None [FreeTextEntry1] : 68yo male referred by Dr. Montiel for evaluation for MRI fusion biopsy. 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 PSA has been trending up, now 10. Had MRI which shows 47gm prostate, 6mm PI-RADS 4 lesion. \par \par 9/30/20 Here for f/u. Had MRI fusion biopsy. Biopsy shows 5/13 cores positive, [place] 3+4 disease, up to 70% core involved. No complications from biopsy. \par \par 10/14/20 Here for f/u. Patient is interested in proceeding with surgery, here for surgery discussion. \par \par 11/30/20 Here for postop visit. Underwent RALP, PNLD 11/19. Doing well. Final path: pT2N0, Gl 3+4, negative margins. \par \par 1/11/21 Here for postop visit. Had PSA last week = 0.23. 1 pad/day. Started Viagra last week had moderate response. \par \par 2/10/21 Here for f/u. 6 week postop PSA was detectable, returns to repeat PSA today. No erections yet. Experiencing mild incontinence, wears 1 pad per day. \par \par 4/07/21 Here for f/u. 3 month postop PSA was detectable, 0.25, not really consistent with his path pT2N0, 3+4 with negative margins. PET Axumin was performed which is negative. Wearing 1 pad/day. +ED. \par \par \par 4/21/21 Here to discuss ADT. Wearing 1 pad/day. Plan for salvage radiation when incontinence improves. \par \par 6/2/21: Here for f/u after 1st dose of ADT (4/21/21). Pt c/o hot flashes. Reports good energy levels. Still walking 2-3 miles. Urinary incontinence has improved. No longer needs pads. Per patient, before end of June he will start radiation treatment. \par \par 7/14/21 Here for f/u for 2nd Lupron injection. Doing well. No pads. Using Viagra prn. Has not started radiation yet\par \par 10/18/21 Here for f/u. Completed radiation 2 weeks ago. Feeling fatigue, +hot flashes, otherwise doing OK. Taking Viagra prn. \par \par 1/19/22 Here for f/u. Recent episode gross hematuria, urine cytology, culture negative. + Hot flashes. Rare BLU, no pads. Taking Viagra prn. \par \par 4/27/22 Here for f/u. Doing well. Recent UTI associated with gross hematuria. Treated with abx. Reports Ucx 1 week ago with PCP neg. He is feeling well today, denies dysuria, hematuria today. ED improved on sildenafil. \par \par 7/25/22 Here for f/u. Doing well, no interval hematuria or UTI. Last labs 4/2022: PSA undetectable, T = 200 \par \par 10/24/22 Here for f/u. Doing well. Occasional leakage but no pads. 7/2022: PSA undetectable, T = 250

## 2022-10-24 NOTE — ASSESSMENT
[FreeTextEntry1] : 70yo male s/p RALP, PLND 11/19/20\par Final path pT2N0, GG2, negative margins\par Completed salvage XRT 10/2021 + ADT 6 months. \par PSA has been undetectable 7/2022\par \par Previous UTI, gross hematuria\par No interval issues\par Neg work up for gross hematuria\par Suspect related to radiation\par \par Check PSA, testosterone today\par  \par Follow up 6 months

## 2022-10-25 LAB
PSA SERPL-MCNC: <0.01 NG/ML
TESTOST SERPL-MCNC: 257 NG/DL

## 2022-10-26 ENCOUNTER — NON-APPOINTMENT (OUTPATIENT)
Age: 72
End: 2022-10-26

## 2022-11-02 ENCOUNTER — EMERGENCY (EMERGENCY)
Facility: HOSPITAL | Age: 72
LOS: 1 days | Discharge: HOME | End: 2022-11-02
Attending: EMERGENCY MEDICINE | Admitting: EMERGENCY MEDICINE
Payer: MEDICARE

## 2022-11-02 VITALS
RESPIRATION RATE: 18 BRPM | HEART RATE: 89 BPM | DIASTOLIC BLOOD PRESSURE: 91 MMHG | OXYGEN SATURATION: 98 % | TEMPERATURE: 98 F | SYSTOLIC BLOOD PRESSURE: 148 MMHG

## 2022-11-02 DIAGNOSIS — Z90.79 ACQUIRED ABSENCE OF OTHER GENITAL ORGAN(S): Chronic | ICD-10-CM

## 2022-11-02 DIAGNOSIS — Z98.890 OTHER SPECIFIED POSTPROCEDURAL STATES: Chronic | ICD-10-CM

## 2022-11-02 LAB
APPEARANCE UR: ABNORMAL
BILIRUB UR-MCNC: ABNORMAL
COLOR SPEC: ABNORMAL
DIFF PNL FLD: ABNORMAL
GLUCOSE UR QL: 250
KETONES UR-MCNC: 15 MG/DL
LEUKOCYTE ESTERASE UR-ACNC: ABNORMAL
NITRITE UR-MCNC: POSITIVE
PH UR: 6.5 — SIGNIFICANT CHANGE UP (ref 5–8)
PROT UR-MCNC: 100 MG/DL
SP GR SPEC: 1.02 — SIGNIFICANT CHANGE UP (ref 1–1.03)
UROBILINOGEN FLD QL: 2 E.U./DL

## 2022-11-02 PROCEDURE — 99283 EMERGENCY DEPT VISIT LOW MDM: CPT

## 2022-11-02 NOTE — ED PROVIDER NOTE - CLINICAL SUMMARY MEDICAL DECISION MAKING FREE TEXT BOX
hematuria today, no abd pain, no cvat, no fever, no vomiting, no dysuria, no urgency, no frequency. no  blood thinners.  -check ua hematuria today, no abd pain, no cvat, no fever, no vomiting, no dysuria, no urgency, no frequency. no  blood thinners. nontoxic, no guarding, no rebound.  -check ua

## 2022-11-02 NOTE — ED ADULT NURSE NOTE - OBJECTIVE STATEMENT
Pt presents to ED C/O hematuria starting yesterday. Denies pelvic pain, burning upon urination, lightheadedness, fevers. Hx of prostate CA, last radiation treatement x 1 year ago as per pt.

## 2022-11-02 NOTE — ED ADULT NURSE NOTE - NSICDXPASTMEDICALHX_GEN_ALL_CORE_FT
unknown
PAST MEDICAL HISTORY:  Hernia     HTN (hypertension)     Hyperlipidemia     Prostate CA

## 2022-11-02 NOTE — ED PROVIDER NOTE - PATIENT PORTAL LINK FT
You can access the FollowMyHealth Patient Portal offered by [facility] by registering at the following website: [email or web address]. By joining [employer]’s FollowMyHealth portal, you will also be able to view your health information using other applications (apps) compatible with our system.

## 2022-11-02 NOTE — ED PROVIDER NOTE - OBJECTIVE STATEMENT
71M hx prostate ca (s/p prostatectomy, radiation), c/o hematuria. pt states started a few hours ago. states initially urine was orange, no blood colored. some small clots. no fevers. no abd pain, no frequency, no urgency, no flank pain. no vomiting.

## 2022-11-02 NOTE — ED PROVIDER NOTE - PROGRESS NOTE DETAILS
+UA will treat for UTI, recommend f/u with   I have discussed the discharge plan with the patient. The patient agrees with the plan, as discussed.  The patient understands Emergency Department diagnosis is a preliminary diagnosis often based on limited information and that the patient must adhere to the follow-up plan as discussed.  The patient understands that if the symptoms worsen or if prescribed medications do not have the desired/planned effect that the patient may return to the Emergency Department at any time for further evaluation and treatment.

## 2022-11-02 NOTE — ED PROVIDER NOTE - NSFOLLOWUPINSTRUCTIONS_ED_ALL_ED_FT
Follow-up with urology      Hematuria, Adult    Hematuria is blood in the urine. Blood may be visible in the urine, or it may be identified with a test. This condition can be caused by infections of the bladder, urethra, kidney, or prostate. Other possible causes include:  •Kidney stones.    •Cancer of the urinary tract.    •Too much calcium in the urine.    •Conditions that are passed from parent to child (inherited conditions).     •Exercise that requires a lot of energy.    Infections can usually be treated with medicine, and a kidney stone usually will pass through your urine. If neither of these is the cause of your hematuria, more tests may be needed to identify the cause of your symptoms.    It is very important to tell your health care provider about any blood in your urine, even if it is painless or the blood stops without treatment. Blood in the urine, when it happens and then stops and then happens again, can be a symptom of a very serious condition, including cancer. There is no pain in the initial stages of many urinary cancers.    Follow these instructions at home:    Medicines     •Take over-the-counter and prescription medicines only as told by your health care provider.    •If you were prescribed an antibiotic medicine, take it as told by your health care provider. Do not stop taking the antibiotic even if you start to feel better.    Eating and drinking     •Drink enough fluid to keep your urine pale yellow. It is recommended that you drink 3–4 quarts (2.8–3.8 L) a day. If you have been diagnosed with an infection, drinking cranberry juice in addition to large amounts of water is recommended.    •Avoid caffeine, tea, and carbonated beverages. These tend to irritate the bladder.    •Avoid alcohol because it may irritate the prostate (in males).    General instructions     •If you have been diagnosed with a kidney stone, follow your health care provider's instructions about straining your urine to catch the stone.    •Empty your bladder often. Avoid holding urine for long periods of time.    •If you are female:  •After a bowel movement, wipe from front to back and use each piece of toilet paper only once.    •Empty your bladder before and after sex.    •Pay attention to any changes in your symptoms. Tell your health care provider about any changes or any new symptoms.    •It is up to you to get the results of any tests. Ask your health care provider, or the department that is doing the test, when your results will be ready.    •Keep all follow-up visits. This is important.    Contact a health care provider if:    •You develop back pain.    •You have a fever or chills.    •You have nausea or vomiting.    •Your symptoms do not improve after 3 days.    •Your symptoms get worse.    Get help right away if:    •You develop severe vomiting and are unable to take medicine without vomiting.    •You develop severe pain in your back or abdomen even though you are taking medicine.    •You pass a large amount of blood in your urine.    •You pass blood clots in your urine.    •You feel very weak or like you might faint.    •You faint.    Summary    •Hematuria is blood in the urine. It has many possible causes.    •It is very important that you tell your health care provider about any blood in your urine, even if it is painless or the blood stops without treatment.    •Take over-the-counter and prescription medicines only as told by your health care provider.    •Drink enough fluid to keep your urine pale yellow.    This information is not intended to replace advice given to you by your health care provider. Make sure you discuss any questions you have with your health care provider.

## 2022-11-03 LAB
BACTERIA # UR AUTO: PRESENT /HPF
EPI CELLS # UR: SIGNIFICANT CHANGE UP /HPF (ref 0–5)
RBC CASTS # UR COMP ASSIST: ABNORMAL /HPF
WBC UR QL: ABNORMAL /HPF

## 2022-11-03 PROCEDURE — 87086 URINE CULTURE/COLONY COUNT: CPT

## 2022-11-03 PROCEDURE — 99283 EMERGENCY DEPT VISIT LOW MDM: CPT

## 2022-11-03 PROCEDURE — 81001 URINALYSIS AUTO W/SCOPE: CPT

## 2022-11-03 RX ORDER — CEFPODOXIME PROXETIL 100 MG
1 TABLET ORAL
Qty: 14 | Refills: 0
Start: 2022-11-03 | End: 2022-11-09

## 2022-11-03 RX ORDER — CEFPODOXIME PROXETIL 100 MG
100 TABLET ORAL ONCE
Refills: 0 | Status: DISCONTINUED | OUTPATIENT
Start: 2022-11-03 | End: 2022-11-03

## 2022-11-03 RX ORDER — CEFPODOXIME PROXETIL 100 MG
200 TABLET ORAL ONCE
Refills: 0 | Status: COMPLETED | OUTPATIENT
Start: 2022-11-03 | End: 2022-11-03

## 2022-11-03 RX ADMIN — Medication 200 MILLIGRAM(S): at 00:24

## 2022-11-04 LAB
CULTURE RESULTS: SIGNIFICANT CHANGE UP
SPECIMEN SOURCE: SIGNIFICANT CHANGE UP

## 2022-11-06 DIAGNOSIS — N39.0 URINARY TRACT INFECTION, SITE NOT SPECIFIED: ICD-10-CM

## 2022-11-06 DIAGNOSIS — Z92.3 PERSONAL HISTORY OF IRRADIATION: ICD-10-CM

## 2022-11-06 DIAGNOSIS — Z85.46 PERSONAL HISTORY OF MALIGNANT NEOPLASM OF PROSTATE: ICD-10-CM

## 2022-11-06 DIAGNOSIS — Z87.891 PERSONAL HISTORY OF NICOTINE DEPENDENCE: ICD-10-CM

## 2022-11-06 DIAGNOSIS — I10 ESSENTIAL (PRIMARY) HYPERTENSION: ICD-10-CM

## 2022-11-06 DIAGNOSIS — R31.9 HEMATURIA, UNSPECIFIED: ICD-10-CM

## 2022-11-06 DIAGNOSIS — Z87.19 PERSONAL HISTORY OF OTHER DISEASES OF THE DIGESTIVE SYSTEM: ICD-10-CM

## 2022-11-06 DIAGNOSIS — Z90.79 ACQUIRED ABSENCE OF OTHER GENITAL ORGAN(S): ICD-10-CM

## 2022-11-06 DIAGNOSIS — E78.5 HYPERLIPIDEMIA, UNSPECIFIED: ICD-10-CM

## 2022-11-09 ENCOUNTER — APPOINTMENT (OUTPATIENT)
Dept: UROLOGY | Facility: CLINIC | Age: 72
End: 2022-11-09

## 2022-11-09 VITALS — DIASTOLIC BLOOD PRESSURE: 84 MMHG | HEART RATE: 74 BPM | TEMPERATURE: 97.7 F | SYSTOLIC BLOOD PRESSURE: 151 MMHG

## 2022-11-09 PROCEDURE — 52000 CYSTOURETHROSCOPY: CPT

## 2022-11-09 PROCEDURE — 99213 OFFICE O/P EST LOW 20 MIN: CPT | Mod: 25

## 2022-11-09 NOTE — ASSESSMENT
[FreeTextEntry1] : 70yo male s/p RALP, PLND 11/19/20\par Final path pT2N0, GG2, negative margins\par Completed salvage XRT 10/2021 + ADT 6 months. \par PSA has been undetectable 10/2022\par \par Recent gross hematuria\par Urine culture negative\par CT urogram 4/2022 negative\par Cystoscopy today negative\par Urine culture, cytology\par \par F/u as scheduled

## 2022-11-09 NOTE — HISTORY OF PRESENT ILLNESS
[Erectile Dysfunction] : Erectile Dysfunction [None] : None [FreeTextEntry1] : 68yo male referred by Dr. Montiel for evaluation for MRI fusion biopsy. 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 PSA has been trending up, now 10. Had MRI which shows 47gm prostate, 6mm PI-RADS 4 lesion. \par \par 9/30/20 Here for f/u. Had MRI fusion biopsy. Biopsy shows 5/13 cores positive, [place] 3+4 disease, up to 70% core involved. No complications from biopsy. \par \par 10/14/20 Here for f/u. Patient is interested in proceeding with surgery, here for surgery discussion. \par \par 11/30/20 Here for postop visit. Underwent RALP, PNLD 11/19. Doing well. Final path: pT2N0, Gl 3+4, negative margins. \par \par 1/11/21 Here for postop visit. Had PSA last week = 0.23. 1 pad/day. Started Viagra last week had moderate response. \par \par 2/10/21 Here for f/u. 6 week postop PSA was detectable, returns to repeat PSA today. No erections yet. Experiencing mild incontinence, wears 1 pad per day. \par \par 4/07/21 Here for f/u. 3 month postop PSA was detectable, 0.25, not really consistent with his path pT2N0, 3+4 with negative margins. PET Axumin was performed which is negative. Wearing 1 pad/day. +ED. \par \par \par 4/21/21 Here to discuss ADT. Wearing 1 pad/day. Plan for salvage radiation when incontinence improves. \par \par 6/2/21: Here for f/u after 1st dose of ADT (4/21/21). Pt c/o hot flashes. Reports good energy levels. Still walking 2-3 miles. Urinary incontinence has improved. No longer needs pads. Per patient, before end of June he will start radiation treatment. \par \par 7/14/21 Here for f/u for 2nd Lupron injection. Doing well. No pads. Using Viagra prn. Has not started radiation yet\par \par 10/18/21 Here for f/u. Completed radiation 2 weeks ago. Feeling fatigue, +hot flashes, otherwise doing OK. Taking Viagra prn. \par \par 1/19/22 Here for f/u. Recent episode gross hematuria, urine cytology, culture negative. + Hot flashes. Rare BLU, no pads. Taking Viagra prn. \par \par 4/27/22 Here for f/u. Doing well. Recent UTI associated with gross hematuria. Treated with abx. Reports Ucx 1 week ago with PCP neg. He is feeling well today, denies dysuria, hematuria today. ED improved on sildenafil. \par \par 7/25/22 Here for f/u. Doing well, no interval hematuria or UTI. Last labs 4/2022: PSA undetectable, T = 200 \par \par 10/24/22 Here for f/u. Doing well. Occasional leakage but no pads. 7/2022: PSA undetectable, T = 250\par \par 11/9/22 Seen 2 weeks ago, PSA was undetectable. Went to ED last week with gross hematuria. Urine culture was negative. No imaging performed. Urine has cleared up, was advised to follow up for workup. Had negative CT urogram in April.

## 2022-11-11 LAB — BACTERIA UR CULT: NORMAL

## 2022-11-15 LAB — URINE CYTOLOGY: NORMAL

## 2023-01-20 ENCOUNTER — APPOINTMENT (OUTPATIENT)
Dept: UROLOGY | Facility: CLINIC | Age: 73
End: 2023-01-20
Payer: MEDICARE

## 2023-01-20 VITALS — TEMPERATURE: 97.9 F | SYSTOLIC BLOOD PRESSURE: 155 MMHG | HEART RATE: 76 BPM | DIASTOLIC BLOOD PRESSURE: 83 MMHG

## 2023-01-20 PROCEDURE — 99214 OFFICE O/P EST MOD 30 MIN: CPT

## 2023-01-20 NOTE — ASSESSMENT
[FreeTextEntry1] : ED post prostate cancer s/p RALP s/p RT ADT\par reviewed options ICI and IPP\par cont PDE5i - refilled\par UA UCX today\par f/u PRN

## 2023-01-20 NOTE — HISTORY OF PRESENT ILLNESS
[FreeTextEntry1] : prostate cancer s/p RP and salvage RT\par following with marvinstein \par persistent gross heamturia intermittent\par seen by MF underwent cysto\par small clots noted in urine\par ED well controlled\par 10/2022 PSA undetectable

## 2023-01-23 DIAGNOSIS — N39.0 URINARY TRACT INFECTION, SITE NOT SPECIFIED: ICD-10-CM

## 2023-01-23 LAB
APPEARANCE: CLEAR
BACTERIA: NEGATIVE
BILIRUBIN URINE: NEGATIVE
BLOOD URINE: ABNORMAL
COLOR: ABNORMAL
GLUCOSE QUALITATIVE U: NEGATIVE
HYALINE CASTS: 1 /LPF
KETONES URINE: NEGATIVE
LEUKOCYTE ESTERASE URINE: NEGATIVE
MICROSCOPIC-UA: NORMAL
NITRITE URINE: NEGATIVE
PH URINE: 7
PROTEIN URINE: ABNORMAL
RED BLOOD CELLS URINE: >720 /HPF
SPECIFIC GRAVITY URINE: 1.02
SQUAMOUS EPITHELIAL CELLS: 0 /HPF
UROBILINOGEN URINE: NORMAL
WHITE BLOOD CELLS URINE: 4 /HPF

## 2023-01-23 RX ORDER — AMOXICILLIN AND CLAVULANATE POTASSIUM 875; 125 MG/1; MG/1
875-125 TABLET, COATED ORAL
Qty: 14 | Refills: 0 | Status: ACTIVE | COMMUNITY
Start: 2023-01-23 | End: 1900-01-01

## 2023-01-24 LAB — BACTERIA UR CULT: ABNORMAL

## 2023-01-31 ENCOUNTER — NON-APPOINTMENT (OUTPATIENT)
Age: 73
End: 2023-01-31

## 2023-02-06 LAB
APPEARANCE: ABNORMAL
BACTERIA UR CULT: NORMAL
BACTERIA: NEGATIVE
BILIRUBIN URINE: ABNORMAL
BLOOD URINE: ABNORMAL
CALCIUM OXALATE CRYSTALS: ABNORMAL
COLOR: ABNORMAL
GLUCOSE QUALITATIVE U: ABNORMAL
HYALINE CASTS: 0 /LPF
KETONES URINE: NORMAL
LEUKOCYTE ESTERASE URINE: NEGATIVE
MICROSCOPIC-UA: NORMAL
NITRITE URINE: NEGATIVE
PH URINE: 6.5
PROTEIN URINE: ABNORMAL
RED BLOOD CELLS URINE: >720 /HPF
SPECIFIC GRAVITY URINE: >=1.03
SQUAMOUS EPITHELIAL CELLS: 6 /HPF
UROBILINOGEN URINE: NORMAL
WHITE BLOOD CELLS URINE: 5 /HPF

## 2023-02-07 ENCOUNTER — NON-APPOINTMENT (OUTPATIENT)
Age: 73
End: 2023-02-07

## 2023-02-21 NOTE — REVIEW OF SYSTEMS
18-Feb-2023 22:13 [Feeling Tired] : feeling tired [see HPI] : see HPI [Hot Flashes] : hot flashes [Erectile Dysfunction] : erectile dysfunction [Negative] : Heme/Lymph

## 2023-04-24 ENCOUNTER — APPOINTMENT (OUTPATIENT)
Dept: UROLOGY | Facility: CLINIC | Age: 73
End: 2023-04-24
Payer: MEDICARE

## 2023-04-24 VITALS — TEMPERATURE: 98 F | SYSTOLIC BLOOD PRESSURE: 169 MMHG | HEART RATE: 83 BPM | DIASTOLIC BLOOD PRESSURE: 91 MMHG

## 2023-04-24 PROCEDURE — 99213 OFFICE O/P EST LOW 20 MIN: CPT

## 2023-04-24 NOTE — HISTORY OF PRESENT ILLNESS
[Erectile Dysfunction] : Erectile Dysfunction [None] : None [FreeTextEntry1] : 68yo male referred by Dr. Montiel for evaluation for MRI fusion biopsy. 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 PSA has been trending up, now 10. Had MRI which shows 47gm prostate, 6mm PI-RADS 4 lesion. \par \par 9/30/20 Here for f/u. Had MRI fusion biopsy. Biopsy shows 5/13 cores positive, [place] 3+4 disease, up to 70% core involved. No complications from biopsy. \par \par 10/14/20 Here for f/u. Patient is interested in proceeding with surgery, here for surgery discussion. \par \par 11/30/20 Here for postop visit. Underwent RALP, PNLD 11/19. Doing well. Final path: pT2N0, Gl 3+4, negative margins. \par \par 1/11/21 Here for postop visit. Had PSA last week = 0.23. 1 pad/day. Started Viagra last week had moderate response. \par \par 2/10/21 Here for f/u. 6 week postop PSA was detectable, returns to repeat PSA today. No erections yet. Experiencing mild incontinence, wears 1 pad per day. \par \par 4/07/21 Here for f/u. 3 month postop PSA was detectable, 0.25, not really consistent with his path pT2N0, 3+4 with negative margins. PET Axumin was performed which is negative. Wearing 1 pad/day. +ED. \par \par \par 4/21/21 Here to discuss ADT. Wearing 1 pad/day. Plan for salvage radiation when incontinence improves. \par \par 6/2/21: Here for f/u after 1st dose of ADT (4/21/21). Pt c/o hot flashes. Reports good energy levels. Still walking 2-3 miles. Urinary incontinence has improved. No longer needs pads. Per patient, before end of June he will start radiation treatment. \par \par 7/14/21 Here for f/u for 2nd Lupron injection. Doing well. No pads. Using Viagra prn. Has not started radiation yet\par \par 10/18/21 Here for f/u. Completed radiation 2 weeks ago. Feeling fatigue, +hot flashes, otherwise doing OK. Taking Viagra prn. \par \par 1/19/22 Here for f/u. Recent episode gross hematuria, urine cytology, culture negative. + Hot flashes. Rare BLU, no pads. Taking Viagra prn. \par \par 4/27/22 Here for f/u. Doing well. Recent UTI associated with gross hematuria. Treated with abx. Reports Ucx 1 week ago with PCP neg. He is feeling well today, denies dysuria, hematuria today. ED improved on sildenafil. \par \par 7/25/22 Here for f/u. Doing well, no interval hematuria or UTI. Last labs 4/2022: PSA undetectable, T = 200 \par \par 10/24/22 Here for f/u. Doing well. Occasional leakage but no pads. 7/2022: PSA undetectable, T = 250\par \par 11/9/22 Seen 2 weeks ago, PSA was undetectable. Went to ED last week with gross hematuria. Urine culture was negative. No imaging performed. Urine has cleared up, was advised to follow up for workup. Had negative CT urogram in April. \par \par 4/24/23 Here for f/u. Last PSA undetectable. Still with occasional hematuria, light, no clots. 2 negative workups in 2022.  [Hematuria - Gross] : gross hematuria

## 2023-04-24 NOTE — ASSESSMENT
[FreeTextEntry1] : 73yo male s/p RALP, PLND 11/19/20\par Final path pT2N0, GG2, negative margins\par Completed salvage XRT 10/2021 + ADT 6 months. \par PSA has been undetectable 10/2022\par Repeat PSA today\par \par Intermittent gross hematuria\par Last negative workup 11/2022\par Repeat urine testing today\par \par Post treatment ED\par Sildenafil renewed\par \par F/u 3 months

## 2023-04-24 NOTE — PHYSICAL EXAM
[General Appearance - Well Developed] : well developed [General Appearance - Well Nourished] : well nourished [Normal Appearance] : normal appearance [Well Groomed] : well groomed [General Appearance - In No Acute Distress] : no acute distress [] : no rash [Oriented To Time, Place, And Person] : oriented to person, place, and time [Affect] : the affect was normal [Mood] : the mood was normal [Not Anxious] : not anxious [No Focal Deficits] : no focal deficits [Normal Station and Gait] : the gait and station were normal for the patient's age

## 2023-05-02 LAB
APPEARANCE: CLEAR
BACTERIA UR CULT: ABNORMAL
BACTERIA: NEGATIVE /HPF
BILIRUBIN URINE: NEGATIVE
BLOOD URINE: NEGATIVE
CALCIUM OXALATE CRYSTALS: PRESENT
CAST: 3 /LPF
COLOR: YELLOW
EPITHELIAL CELLS: 1 /HPF
GLUCOSE QUALITATIVE U: NEGATIVE MG/DL
KETONES URINE: NEGATIVE MG/DL
LEUKOCYTE ESTERASE URINE: NEGATIVE
MICROSCOPIC-UA: NORMAL
NITRITE URINE: NEGATIVE
PH URINE: 6
PROTEIN URINE: 30 MG/DL
PSA SERPL-MCNC: <0.01 NG/ML
RED BLOOD CELLS URINE: 21 /HPF
REVIEW: NORMAL
SPECIFIC GRAVITY URINE: 1.02
URINE CYTOLOGY: NORMAL
UROBILINOGEN URINE: 0.2 MG/DL
WHITE BLOOD CELLS URINE: 1 /HPF

## 2023-05-16 NOTE — ED ADULT NURSE NOTE - PRO INTERPRETER NEED 2
PRINCIPAL DISCHARGE DIAGNOSIS  Diagnosis: Atrial flutter  Assessment and Plan of Treatment:      English

## 2023-06-27 NOTE — PRE-OP CHECKLIST - AS BP NONINV SITE
6/27/2023         RE: Kaitlynn Mendes  [address]  [place] [address]        Dear Colleague,    Thank you for referring your patient, Kaitlynn Mendes, to the [facility] [place]. Please see a copy of my visit note below.    Chief complaint status post left tibia IM nail and proximal locking screw removal performed on May 10, 2023    Suzan presents today for further follow-up in the company of her boyfriend.  Reports to be doing well from a tibia pain perspective reports to still have a fair amount of knee pain especially if this is more painful with going up and down stairs    On today's exam she presents with a fairly unremarkable exam with a well-healed surgical incision no effusion full range of motion of the knee.    Assessment status post left tibia hardware removal    Plan discussed with the patient and her boyfriend that at this point I think that she needs to proceed with physical therapy for the left knee which I suspect that she is suffering from some component of weakness.  A prescription for that report was given to the patient    She will follow-up on a as needed basis.  We encouraged her to visit with us in 2-1/2 months if she is still having problems with the left knee.    All questions were answered.    Sincerely,        Dio Olivarez MD    
right upper arm

## 2023-07-24 ENCOUNTER — APPOINTMENT (OUTPATIENT)
Dept: UROLOGY | Facility: CLINIC | Age: 73
End: 2023-07-24
Payer: MEDICARE

## 2023-07-24 VITALS
WEIGHT: 185 LBS | TEMPERATURE: 97.3 F | HEART RATE: 65 BPM | BODY MASS INDEX: 26.48 KG/M2 | OXYGEN SATURATION: 99 % | HEIGHT: 70 IN | SYSTOLIC BLOOD PRESSURE: 150 MMHG | DIASTOLIC BLOOD PRESSURE: 81 MMHG

## 2023-07-24 DIAGNOSIS — N52.31 ERECTILE DYSFUNCTION FOLLOWING RADICAL PROSTATECTOMY: ICD-10-CM

## 2023-07-24 PROCEDURE — 99214 OFFICE O/P EST MOD 30 MIN: CPT

## 2023-07-24 RX ORDER — SILDENAFIL 100 MG/1
100 TABLET, FILM COATED ORAL
Qty: 6 | Refills: 11 | Status: ACTIVE | COMMUNITY
Start: 2021-01-05 | End: 1900-01-01

## 2023-07-24 NOTE — HISTORY OF PRESENT ILLNESS
[Erectile Dysfunction] : Erectile Dysfunction [Hematuria - Gross] : gross hematuria [None] : None [FreeTextEntry1] : 70yo male referred by Dr. Montiel for evaluation for MRI fusion biopsy. 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 PSA has been trending up, now 10. Had MRI which shows 47gm prostate, 6mm PI-RADS 4 lesion. \par \par 9/30/20 Here for f/u. Had MRI fusion biopsy. Biopsy shows 5/13 cores positive, [place] 3+4 disease, up to 70% core involved. No complications from biopsy. \par \par 10/14/20 Here for f/u. Patient is interested in proceeding with surgery, here for surgery discussion. \par \par 11/30/20 Here for postop visit. Underwent RALP, PNLD 11/19. Doing well. Final path: pT2N0, Gl 3+4, negative margins. \par \par 1/11/21 Here for postop visit. Had PSA last week = 0.23. 1 pad/day. Started Viagra last week had moderate response. \par \par 2/10/21 Here for f/u. 6 week postop PSA was detectable, returns to repeat PSA today. No erections yet. Experiencing mild incontinence, wears 1 pad per day. \par \par 4/07/21 Here for f/u. 3 month postop PSA was detectable, 0.25, not really consistent with his path pT2N0, 3+4 with negative margins. PET Axumin was performed which is negative. Wearing 1 pad/day. +ED. \par \par \par 4/21/21 Here to discuss ADT. Wearing 1 pad/day. Plan for salvage radiation when incontinence improves. \par \par 6/2/21: Here for f/u after 1st dose of ADT (4/21/21). Pt c/o hot flashes. Reports good energy levels. Still walking 2-3 miles. Urinary incontinence has improved. No longer needs pads. Per patient, before end of June he will start radiation treatment. \par \par 7/14/21 Here for f/u for 2nd Lupron injection. Doing well. No pads. Using Viagra prn. Has not started radiation yet\par \par 10/18/21 Here for f/u. Completed radiation 2 weeks ago. Feeling fatigue, +hot flashes, otherwise doing OK. Taking Viagra prn. \par \par 1/19/22 Here for f/u. Recent episode gross hematuria, urine cytology, culture negative. + Hot flashes. Rare BLU, no pads. Taking Viagra prn. \par \par 4/27/22 Here for f/u. Doing well. Recent UTI associated with gross hematuria. Treated with abx. Reports Ucx 1 week ago with PCP neg. He is feeling well today, denies dysuria, hematuria today. ED improved on sildenafil. \par \par 7/25/22 Here for f/u. Doing well, no interval hematuria or UTI. Last labs 4/2022: PSA undetectable, T = 200 \par \par 10/24/22 Here for f/u. Doing well. Occasional leakage but no pads. 7/2022: PSA undetectable, T = 250\par \par 11/9/22 Seen 2 weeks ago, PSA was undetectable. Went to ED last week with gross hematuria. Urine culture was negative. No imaging performed. Urine has cleared up, was advised to follow up for workup. Had negative CT urogram in April. \par \par 4/24/23 Here for f/u. Last PSA undetectable. Still with occasional hematuria, light, no clots. 2 negative workups in 2022. \par \par 7/24/23 Here for f/u. Last PSA undetectable. Still with occasional hematuria with straining. Mild incontinence, no pads. ED on sildenafil.  [Urinary Incontinence] : urinary incontinence

## 2023-07-24 NOTE — ASSESSMENT
[FreeTextEntry1] : 71yo male s/p RALP, PLND 11/19/20\par Final path pT2N0, GG2, negative margins\par Completed salvage XRT 10/2021 + ADT 6 months. \par PSA has been undetectable 4/2023\par Repeat PSA today\par \par Intermittent gross hematuria\par Last negative workup 11/2022\par \par Post treatment ED\par Sildenafil renewed\par \par F/u 4 months

## 2023-07-25 ENCOUNTER — NON-APPOINTMENT (OUTPATIENT)
Age: 73
End: 2023-07-25

## 2023-07-25 LAB — PSA SERPL-MCNC: <0.01 NG/ML

## 2023-09-20 ENCOUNTER — EMERGENCY (EMERGENCY)
Facility: HOSPITAL | Age: 73
LOS: 1 days | Discharge: HOME | End: 2023-09-20
Attending: EMERGENCY MEDICINE | Admitting: EMERGENCY MEDICINE
Payer: MEDICARE

## 2023-09-20 VITALS
SYSTOLIC BLOOD PRESSURE: 135 MMHG | HEART RATE: 73 BPM | DIASTOLIC BLOOD PRESSURE: 89 MMHG | WEIGHT: 184.97 LBS | HEIGHT: 70 IN | RESPIRATION RATE: 18 BRPM | TEMPERATURE: 99 F | OXYGEN SATURATION: 94 %

## 2023-09-20 VITALS
TEMPERATURE: 98 F | SYSTOLIC BLOOD PRESSURE: 133 MMHG | DIASTOLIC BLOOD PRESSURE: 76 MMHG | OXYGEN SATURATION: 97 % | RESPIRATION RATE: 18 BRPM | HEART RATE: 70 BPM

## 2023-09-20 DIAGNOSIS — Z79.02 LONG TERM (CURRENT) USE OF ANTITHROMBOTICS/ANTIPLATELETS: ICD-10-CM

## 2023-09-20 DIAGNOSIS — I10 ESSENTIAL (PRIMARY) HYPERTENSION: ICD-10-CM

## 2023-09-20 DIAGNOSIS — R31.9 HEMATURIA, UNSPECIFIED: ICD-10-CM

## 2023-09-20 DIAGNOSIS — E78.5 HYPERLIPIDEMIA, UNSPECIFIED: ICD-10-CM

## 2023-09-20 DIAGNOSIS — Z98.890 OTHER SPECIFIED POSTPROCEDURAL STATES: Chronic | ICD-10-CM

## 2023-09-20 DIAGNOSIS — Z85.46 PERSONAL HISTORY OF MALIGNANT NEOPLASM OF PROSTATE: ICD-10-CM

## 2023-09-20 DIAGNOSIS — N39.0 URINARY TRACT INFECTION, SITE NOT SPECIFIED: ICD-10-CM

## 2023-09-20 DIAGNOSIS — Z90.79 ACQUIRED ABSENCE OF OTHER GENITAL ORGAN(S): Chronic | ICD-10-CM

## 2023-09-20 LAB
APPEARANCE UR: ABNORMAL
BILIRUB UR-MCNC: ABNORMAL
COLOR SPEC: YELLOW — SIGNIFICANT CHANGE UP
DIFF PNL FLD: ABNORMAL
GLUCOSE UR QL: NEGATIVE — SIGNIFICANT CHANGE UP
KETONES UR-MCNC: ABNORMAL MG/DL
LEUKOCYTE ESTERASE UR-ACNC: ABNORMAL
NITRITE UR-MCNC: POSITIVE
PH UR: 6.5 — SIGNIFICANT CHANGE UP (ref 5–8)
PROT UR-MCNC: 100 MG/DL
SP GR SPEC: 1.02 — SIGNIFICANT CHANGE UP (ref 1–1.03)
UROBILINOGEN FLD QL: 1 E.U./DL — SIGNIFICANT CHANGE UP

## 2023-09-20 PROCEDURE — 99284 EMERGENCY DEPT VISIT MOD MDM: CPT

## 2023-09-20 PROCEDURE — 81001 URINALYSIS AUTO W/SCOPE: CPT

## 2023-09-20 PROCEDURE — 99283 EMERGENCY DEPT VISIT LOW MDM: CPT

## 2023-09-20 RX ORDER — CEFPODOXIME PROXETIL 100 MG
1 TABLET ORAL
Qty: 14 | Refills: 0
Start: 2023-09-20 | End: 2023-09-26

## 2023-09-20 RX ORDER — CEFPODOXIME PROXETIL 100 MG
200 TABLET ORAL ONCE
Refills: 0 | Status: COMPLETED | OUTPATIENT
Start: 2023-09-20 | End: 2023-09-20

## 2023-09-20 RX ADMIN — Medication 200 MILLIGRAM(S): at 12:43

## 2023-09-20 NOTE — ED PROVIDER NOTE - OBJECTIVE STATEMENT
72M PMH HTN, HLD, prostate cancer s/p RALP 2020, radiation 2021, recurrent hematuria, p/w darker urine x7-10 days. Slight dysuria today. States he gets similar symptoms very frequently but usually self resolves. Came to ED today 2/2 lasting longer than usual. No other systemic symptoms.   Denies body aches, abd pain, fevers, chills, rhinorrhea, cough, sore throat, back pain, lightheaded, SOB/CP, NVD, black/bloody stool, bleeding from elsewhere. Not on AC.

## 2023-09-20 NOTE — ED ADULT TRIAGE NOTE - CHIEF COMPLAINT QUOTE
Pt co hematuria, urinary frequency and dysuria x10 days. Hx prostate CA s/p radiation and prostatectomy. Denies AC use.

## 2023-09-20 NOTE — ED PROVIDER NOTE - PATIENT PORTAL LINK FT
You can access the FollowMyHealth Patient Portal offered by [facility] by registering at the following website: [email or web address]. By joining "[employer]’s FollowMyHealth portal, you will also be able to view your health information using other applications (apps) compatible with our system.

## 2023-09-20 NOTE — ED ADULT NURSE NOTE - SUICIDE SCREENING QUESTION 3
[facility] Medicine Daily Progress Note    Date of Service  9/10/2023    Chief Complaint  Jer Sheffield is a 71 y.o. male admitted 9/9/2023 with dysphagia.  He has metastatic head and neck cancer, s/p chemotherapy/radiation, now with progression from PET scan on 8/11/2023.  He follows Dr. Tovar.     He has a history of hypertension, BPH, and sleep apnea (on CPAP).    Hospital Course  Patient requested to have feeding tube placement, IR were consulted and agreed to place the G-tube on 9/11/2023.     Interval Problem Update  Afebrile and hemodynamically stable.  Evaluated by SLP on 9/9/2023.Per SLP, patient has a documented dysphagia history with a high risk of aspiration and aspiration related sequelae.  He has been recommended to have full liquids despite risk, plus PEG feeding for primary nutrition, which has been planned for tomorrow    I have discussed this patient's plan of care and discharge plan at IDT rounds today with Case Management, Nursing, Nursing leadership, and other members of the IDT team.    Consultants/Specialty  IR    Code Status  DNAR/DNI    Disposition  The patient is not medically cleared for discharge to home or a post-acute facility.  Anticipate discharge to: home with close outpatient follow-up    I have placed the appropriate orders for post-discharge needs.    Review of Systems  Review of Systems   Constitutional:  Positive for malaise/fatigue.   Eyes: Negative.    Respiratory:          Congestion   Cardiovascular: Negative.    Gastrointestinal: Negative.    Genitourinary: Negative.    Musculoskeletal:  Positive for neck pain.   Skin: Negative.    Neurological: Negative.    Endo/Heme/Allergies: Negative.    Psychiatric/Behavioral: Negative.          Physical Exam  Temp:  [36.1 °C (97 °F)-36.4 °C (97.6 °F)] 36.2 °C (97.2 °F)  Pulse:  [80-94] 91  Resp:  [16-18] 17  BP: (104-129)/(64-80) 123/80  SpO2:  [95 %-96 %] 95 %    Physical Exam  Constitutional:       Appearance: He is  ill-appearing.   HENT:      Head: Normocephalic.      Mouth/Throat:      Mouth: Mucous membranes are dry.   Eyes:      Pupils: Pupils are equal, round, and reactive to light.   Cardiovascular:      Rate and Rhythm: Normal rate.   Pulmonary:      Effort: Pulmonary effort is normal.   Abdominal:      Palpations: Abdomen is soft.   Musculoskeletal:      Cervical back: Tenderness present.      Right lower leg: No edema.      Left lower leg: No edema.   Skin:     General: Skin is warm.   Neurological:      General: No focal deficit present.      Mental Status: He is alert.   Psychiatric:         Mood and Affect: Mood normal.         Fluids  No intake or output data in the 24 hours ending 09/10/23 1243    Laboratory  Recent Labs     09/10/23  0435   WBC 6.2   RBC 4.07*   HEMOGLOBIN 11.9*   HEMATOCRIT 34.0*   MCV 83.5   MCH 29.2   MCHC 35.0   RDW 46.0   PLATELETCT 109*   MPV 10.4     Recent Labs     09/10/23  0435   SODIUM 135   POTASSIUM 3.2*   CHLORIDE 103   CO2 19*   GLUCOSE 85   BUN 30*   CREATININE 0.67   CALCIUM 8.2*                   Imaging  IR-GASTROSTOMY PLACEMENT    (Results Pending)        Assessment/Plan  * Dysphagia- (present on admission)  Assessment & Plan  Secondary to head and neck with progressive disease.  Evaluated by SLP on 9/9/2023.Per SLP, patient has a documented dysphagia history with a high risk of aspiration and aspiration related sequelae.  He has been recommended to have full liquids despite risk, plus PEG feeding for primary nutrition, which has been planned for tomorrow.     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  PET scan from 8/11/2023 showed disease progression.Follows Dr. Tovar.     Protein-calorie malnutrition, severe (HCC)- (present on admission)  Assessment & Plan  Secondary to malignancy and dysphagia.  PEG tube planned for 9/11/2023    ROSANNA (obstructive sleep apnea)- (present on admission)  Assessment &  Plan  Ordered CPAP at night    Dehydration- (present on admission)  Assessment & Plan  Continue IVFs         VTE prophylaxis:    enoxaparin ppx       No

## 2023-09-20 NOTE — ED ADULT NURSE NOTE - OBJECTIVE STATEMENT
73 y/o M with pmhx prostatectomy (3 years ago) presents to the ED c/o constant hematuria with intermittent dysuria and increased urinary frequency x10 days. States sx feel similar to UTI in the past. Notes hematuria is common for him occasionally but always resolves. When hematuria became consistent for 10 days with frequency and dysuria, he decided to come here. Denies flank pain, fever, n/v/d, abdominal pain, trauma, and any other complaints at this time. On exam, A&Ox4, NAD, ambulatory on RA. No CVA TTP. No abdominal TTP, soft, ND. VSS, afebrile. Urine dark brown in color, specimen sent to lab.

## 2023-09-20 NOTE — ED PROVIDER NOTE - PHYSICAL EXAMINATION
urine at bedside: dark yellow/light brown. Not red - pt states this is what the color has been for 7-10 days.  no LE edema, normal equal distal pulses, steady unassisted gait.

## 2023-09-20 NOTE — ED PROVIDER NOTE - NSFOLLOWUPINSTRUCTIONS_ED_ALL_ED_FT
Can take tylenol 650mg every 6hrs as needed for pain.    Take antibiotics as prescribed.     Stay well hydrated.    Return for fevers, persistent vomit, uncontrolled pain, worsening breathing, worsening lightheaded, penis or testicle pain/swelling, unable to urinate.    Follow up with primary doctor within 1-2 days.     Follow up with your urologist as soon as possible.    Urinary Tract Infection    A urinary tract infection (UTI) is an infection of any part of the urinary tract, which includes the kidneys, ureters, bladder, and urethra. Risk factors include ignoring your need to urinate, wiping back to front if female, being an uncircumcised male, and having diabetes or a weak immune system. Symptoms include frequent urination, pain or burning with urination, foul smelling urine, cloudy urine, pain in the lower abdomen, blood in the urine, and fever. If you were prescribed an antibiotic medicine, take it as told by your health care provider. Do not stop taking the antibiotic even if you start to feel better.    SEEK IMMEDIATE MEDICAL CARE IF YOU HAVE ANY OF THE FOLLOWING SYMPTOMS: severe back or abdominal pain, fever, inability to keep fluids or medicine down, dizziness/lightheadedness, or a change in mental status.

## 2023-09-20 NOTE — ED PROVIDER NOTE - CLINICAL SUMMARY MEDICAL DECISION MAKING FREE TEXT BOX
72M PMH HTN, HLD, prostate cancer s/p RALP 2020, radiation 2021, recurrent hematuria, p/w darker urine x7-10 days. Slight dysuria today. States he gets similar symptoms very frequently but usually self resolves. Came to ED today 2/2 lasting longer than usual. No other systemic symptoms.   Vitals wnl, exam as above.  ddx: Possible cystitis/UTI vs. related to prior surgery/radiation. Clinically no significant anemia. Clinically not in retention.   UA, reassess.

## 2023-09-20 NOTE — ED PROVIDER NOTE - PROGRESS NOTE DETAILS
[place]: UA+, will tx for UTI, remains well appearing w/o systemic symptoms.   Discussed importance of outpt follow up and return precautions. Clinically no indication for further emergent ED workup or hospitalization at this time. Stable for dc, outpt f/u.

## 2023-09-21 ENCOUNTER — NON-APPOINTMENT (OUTPATIENT)
Age: 73
End: 2023-09-21

## 2023-09-27 ENCOUNTER — APPOINTMENT (OUTPATIENT)
Dept: UROLOGY | Facility: CLINIC | Age: 73
End: 2023-09-27
Payer: MEDICARE

## 2023-09-27 VITALS
BODY MASS INDEX: 27.06 KG/M2 | WEIGHT: 189 LBS | HEIGHT: 70 IN | SYSTOLIC BLOOD PRESSURE: 146 MMHG | DIASTOLIC BLOOD PRESSURE: 90 MMHG | HEART RATE: 84 BPM | TEMPERATURE: 97.9 F

## 2023-09-27 PROCEDURE — 99213 OFFICE O/P EST LOW 20 MIN: CPT

## 2023-11-20 ENCOUNTER — APPOINTMENT (OUTPATIENT)
Dept: UROLOGY | Facility: CLINIC | Age: 73
End: 2023-11-20
Payer: MEDICARE

## 2023-11-20 VITALS — HEART RATE: 75 BPM | SYSTOLIC BLOOD PRESSURE: 152 MMHG | DIASTOLIC BLOOD PRESSURE: 90 MMHG | TEMPERATURE: 97.2 F

## 2023-11-20 DIAGNOSIS — R31.0 GROSS HEMATURIA: ICD-10-CM

## 2023-11-20 DIAGNOSIS — Z92.3 PERSONAL HISTORY OF IRRADIATION: ICD-10-CM

## 2023-11-20 PROCEDURE — 99213 OFFICE O/P EST LOW 20 MIN: CPT

## 2023-11-21 ENCOUNTER — NON-APPOINTMENT (OUTPATIENT)
Age: 73
End: 2023-11-21

## 2023-11-21 LAB — PSA SERPL-MCNC: <0.01 NG/ML

## 2024-05-21 ENCOUNTER — APPOINTMENT (OUTPATIENT)
Dept: UROLOGY | Facility: CLINIC | Age: 74
End: 2024-05-21
Payer: MEDICARE

## 2024-05-21 VITALS
BODY MASS INDEX: 27.06 KG/M2 | HEIGHT: 70 IN | HEART RATE: 76 BPM | TEMPERATURE: 98 F | DIASTOLIC BLOOD PRESSURE: 92 MMHG | WEIGHT: 189 LBS | SYSTOLIC BLOOD PRESSURE: 144 MMHG

## 2024-05-21 DIAGNOSIS — C61 MALIGNANT NEOPLASM OF PROSTATE: ICD-10-CM

## 2024-05-21 PROCEDURE — 99213 OFFICE O/P EST LOW 20 MIN: CPT

## 2024-05-21 RX ORDER — SILDENAFIL 100 MG/1
100 TABLET, FILM COATED ORAL
Qty: 90 | Refills: 3 | Status: ACTIVE | COMMUNITY
Start: 2024-05-21 | End: 1900-01-01

## 2024-05-21 NOTE — ASSESSMENT
[FreeTextEntry1] : 73M here for follow up for CaP and ED   ED - sildenafil 100 gm   CaP - PSA today

## 2024-05-21 NOTE — PHYSICAL EXAM
[Normal Appearance] : normal appearance [General Appearance - In No Acute Distress] : no acute distress [Edema] : no peripheral edema [] : no respiratory distress [Exaggerated Use Of Accessory Muscles For Inspiration] : no accessory muscle use [Abdomen Soft] : soft [Abdomen Tenderness] : non-tender [Normal Station and Gait] : the gait and station were normal for the patient's age [Oriented To Time, Place, And Person] : oriented to person, place, and time [Affect] : the affect was normal

## 2024-05-21 NOTE — HISTORY OF PRESENT ILLNESS
[FreeTextEntry1] : 73M here for follow up Prostate cancer hx of hematuria   - GG1 CaP 2016 - PIRADS 4 lesion 47 gm  - denies any hematuria since last visit.  - sildenafil 100 mg for ED  - PSA: undetectable

## 2024-05-22 LAB
PSA FREE FLD-MCNC: NORMAL %
PSA FREE SERPL-MCNC: <0.01 NG/ML
PSA SERPL-MCNC: <0.01 NG/ML

## 2024-11-07 ENCOUNTER — APPOINTMENT (OUTPATIENT)
Dept: UROLOGY | Facility: CLINIC | Age: 74
End: 2024-11-07
Payer: MEDICARE

## 2024-11-07 ENCOUNTER — NON-APPOINTMENT (OUTPATIENT)
Age: 74
End: 2024-11-07

## 2024-11-07 VITALS
SYSTOLIC BLOOD PRESSURE: 146 MMHG | BODY MASS INDEX: 27.06 KG/M2 | TEMPERATURE: 97.1 F | DIASTOLIC BLOOD PRESSURE: 77 MMHG | HEIGHT: 70 IN | HEART RATE: 75 BPM | WEIGHT: 189 LBS

## 2024-11-07 DIAGNOSIS — N52.31 ERECTILE DYSFUNCTION FOLLOWING RADICAL PROSTATECTOMY: ICD-10-CM

## 2024-11-07 DIAGNOSIS — Z79.818 LONG TERM (CURRENT) USE OF OTHER AGENTS AFFECTING ESTROGEN RECEPTORS AND ESTROGEN LEVELS: ICD-10-CM

## 2024-11-07 DIAGNOSIS — C61 MALIGNANT NEOPLASM OF PROSTATE: ICD-10-CM

## 2024-11-07 PROCEDURE — 99214 OFFICE O/P EST MOD 30 MIN: CPT

## 2024-11-07 PROCEDURE — G2211 COMPLEX E/M VISIT ADD ON: CPT

## 2024-11-07 RX ORDER — TADALAFIL 20 MG/1
20 TABLET ORAL
Qty: 6 | Refills: 11 | Status: ACTIVE | COMMUNITY
Start: 2024-11-07 | End: 1900-01-01

## 2024-11-11 LAB
APPEARANCE: CLEAR
BACTERIA UR CULT: NORMAL
BACTERIA: NEGATIVE /HPF
BILIRUBIN URINE: NEGATIVE
BLOOD URINE: NEGATIVE
CAST: 1 /LPF
COLOR: YELLOW
EPITHELIAL CELLS: 1 /HPF
GLUCOSE QUALITATIVE U: NEGATIVE MG/DL
KETONES URINE: NEGATIVE MG/DL
LEUKOCYTE ESTERASE URINE: NEGATIVE
MICROSCOPIC-UA: NORMAL
NITRITE URINE: NEGATIVE
PH URINE: 6.5
PROTEIN URINE: 30 MG/DL
PSA FREE FLD-MCNC: NORMAL %
PSA FREE SERPL-MCNC: <0.01 NG/ML
PSA SERPL-MCNC: <0.01 NG/ML
RED BLOOD CELLS URINE: 1 /HPF
SPECIFIC GRAVITY URINE: 1.02
TESTOST SERPL-MCNC: 297 NG/DL
UROBILINOGEN URINE: 0.2 MG/DL
WHITE BLOOD CELLS URINE: 0 /HPF